# Patient Record
Sex: FEMALE | Race: BLACK OR AFRICAN AMERICAN | NOT HISPANIC OR LATINO | URBAN - METROPOLITAN AREA
[De-identification: names, ages, dates, MRNs, and addresses within clinical notes are randomized per-mention and may not be internally consistent; named-entity substitution may affect disease eponyms.]

---

## 2020-01-13 ENCOUNTER — INPATIENT (INPATIENT)
Facility: HOSPITAL | Age: 35
LOS: 3 days | Discharge: HOME | End: 2020-01-17
Attending: INTERNAL MEDICINE | Admitting: INTERNAL MEDICINE
Payer: MEDICAID

## 2020-01-13 VITALS
RESPIRATION RATE: 18 BRPM | HEART RATE: 119 BPM | OXYGEN SATURATION: 100 % | TEMPERATURE: 98 F | DIASTOLIC BLOOD PRESSURE: 68 MMHG | SYSTOLIC BLOOD PRESSURE: 126 MMHG

## 2020-01-13 LAB
ALBUMIN SERPL ELPH-MCNC: 5 G/DL — SIGNIFICANT CHANGE UP (ref 3.5–5.2)
ALP SERPL-CCNC: 133 U/L — HIGH (ref 30–115)
ALT FLD-CCNC: 25 U/L — SIGNIFICANT CHANGE UP (ref 0–41)
ANION GAP SERPL CALC-SCNC: 37 MMOL/L — HIGH (ref 7–14)
APPEARANCE UR: CLEAR — SIGNIFICANT CHANGE UP
APTT BLD: 33.2 SEC — SIGNIFICANT CHANGE UP (ref 27–39.2)
AST SERPL-CCNC: 37 U/L — SIGNIFICANT CHANGE UP (ref 0–41)
B-OH-BUTYR SERPL-SCNC: >9 MMOL/L — HIGH
BACTERIA # UR AUTO: NEGATIVE — SIGNIFICANT CHANGE UP
BASE EXCESS BLDV CALC-SCNC: -27.1 MMOL/L — LOW (ref -2–2)
BASOPHILS # BLD AUTO: 0 K/UL — SIGNIFICANT CHANGE UP (ref 0–0.2)
BASOPHILS NFR BLD AUTO: 0 % — SIGNIFICANT CHANGE UP (ref 0–1)
BILIRUB SERPL-MCNC: <0.2 MG/DL — SIGNIFICANT CHANGE UP (ref 0.2–1.2)
BILIRUB UR-MCNC: NEGATIVE — SIGNIFICANT CHANGE UP
BUN SERPL-MCNC: 25 MG/DL — HIGH (ref 10–20)
CA-I SERPL-SCNC: 1.33 MMOL/L — HIGH (ref 1.12–1.3)
CALCIUM SERPL-MCNC: 10.3 MG/DL — HIGH (ref 8.5–10.1)
CHLORIDE SERPL-SCNC: 95 MMOL/L — LOW (ref 98–110)
CO2 SERPL-SCNC: 5 MMOL/L — CRITICAL LOW (ref 17–32)
COLOR SPEC: COLORLESS — SIGNIFICANT CHANGE UP
CREAT SERPL-MCNC: 1.7 MG/DL — HIGH (ref 0.7–1.5)
DIFF PNL FLD: SIGNIFICANT CHANGE UP
EOSINOPHIL # BLD AUTO: 0.23 K/UL — SIGNIFICANT CHANGE UP (ref 0–0.7)
EOSINOPHIL NFR BLD AUTO: 0.9 % — SIGNIFICANT CHANGE UP (ref 0–8)
EPI CELLS # UR: 1 /HPF — SIGNIFICANT CHANGE UP (ref 0–5)
GAS PNL BLDV: 138 MMOL/L — SIGNIFICANT CHANGE UP (ref 136–145)
GAS PNL BLDV: SIGNIFICANT CHANGE UP
GIANT PLATELETS BLD QL SMEAR: PRESENT — SIGNIFICANT CHANGE UP
GLUCOSE BLDC GLUCOMTR-MCNC: 375 MG/DL — HIGH (ref 70–99)
GLUCOSE BLDC GLUCOMTR-MCNC: 410 MG/DL — HIGH (ref 70–99)
GLUCOSE BLDC GLUCOMTR-MCNC: 489 MG/DL — CRITICAL HIGH (ref 70–99)
GLUCOSE BLDC GLUCOMTR-MCNC: 556 MG/DL — CRITICAL HIGH (ref 70–99)
GLUCOSE BLDC GLUCOMTR-MCNC: 585 MG/DL — CRITICAL HIGH (ref 70–99)
GLUCOSE SERPL-MCNC: 938 MG/DL — CRITICAL HIGH (ref 70–99)
GLUCOSE UR QL: ABNORMAL
HCG SERPL QL: NEGATIVE — SIGNIFICANT CHANGE UP
HCO3 BLDV-SCNC: 5 MMOL/L — LOW (ref 22–29)
HCT VFR BLD CALC: 49.3 % — HIGH (ref 37–47)
HCT VFR BLDA CALC: 44.4 % — HIGH (ref 34–44)
HGB BLD CALC-MCNC: 14.5 G/DL — SIGNIFICANT CHANGE UP (ref 14–18)
HGB BLD-MCNC: 13.8 G/DL — SIGNIFICANT CHANGE UP (ref 12–16)
HYALINE CASTS # UR AUTO: 2 /LPF — SIGNIFICANT CHANGE UP (ref 0–7)
INR BLD: 1.17 RATIO — SIGNIFICANT CHANGE UP (ref 0.65–1.3)
KETONES UR-MCNC: ABNORMAL
LACTATE BLDV-MCNC: 7.2 MMOL/L — HIGH (ref 0.5–1.6)
LEUKOCYTE ESTERASE UR-ACNC: NEGATIVE — SIGNIFICANT CHANGE UP
LIDOCAIN IGE QN: 17 U/L — SIGNIFICANT CHANGE UP (ref 7–60)
LYMPHOCYTES # BLD AUTO: 1.59 K/UL — SIGNIFICANT CHANGE UP (ref 1.2–3.4)
LYMPHOCYTES # BLD AUTO: 6.2 % — LOW (ref 20.5–51.1)
MAGNESIUM SERPL-MCNC: 2.9 MG/DL — HIGH (ref 1.8–2.4)
MANUAL SMEAR VERIFICATION: SIGNIFICANT CHANGE UP
MCHC RBC-ENTMCNC: 27.2 PG — SIGNIFICANT CHANGE UP (ref 27–31)
MCHC RBC-ENTMCNC: 28 G/DL — LOW (ref 32–37)
MCV RBC AUTO: 97.2 FL — SIGNIFICANT CHANGE UP (ref 81–99)
METAMYELOCYTES # FLD: 0.9 % — HIGH (ref 0–0)
MONOCYTES # BLD AUTO: 1.82 K/UL — HIGH (ref 0.1–0.6)
MONOCYTES NFR BLD AUTO: 7.1 % — SIGNIFICANT CHANGE UP (ref 1.7–9.3)
NEUTROPHILS # BLD AUTO: 21.71 K/UL — HIGH (ref 1.4–6.5)
NEUTROPHILS NFR BLD AUTO: 71.7 % — SIGNIFICANT CHANGE UP (ref 42.2–75.2)
NEUTS BAND # BLD: 13.2 % — HIGH (ref 0–6)
NITRITE UR-MCNC: NEGATIVE — SIGNIFICANT CHANGE UP
NRBC # BLD: 1 /100 — HIGH (ref 0–0)
NRBC # BLD: SIGNIFICANT CHANGE UP /100 WBCS (ref 0–0)
PCO2 BLDV: 25 MMHG — LOW (ref 41–51)
PH BLDV: 6.9 — LOW (ref 7.26–7.43)
PH UR: 5.5 — SIGNIFICANT CHANGE UP (ref 5–8)
PLAT MORPH BLD: NORMAL — SIGNIFICANT CHANGE UP
PLATELET # BLD AUTO: 522 K/UL — HIGH (ref 130–400)
PO2 BLDV: 42 MMHG — HIGH (ref 20–40)
POIKILOCYTOSIS BLD QL AUTO: SLIGHT — SIGNIFICANT CHANGE UP
POTASSIUM BLDV-SCNC: 6.8 MMOL/L — HIGH (ref 3.3–5.6)
POTASSIUM SERPL-MCNC: 7.2 MMOL/L — CRITICAL HIGH (ref 3.5–5)
POTASSIUM SERPL-SCNC: 7.2 MMOL/L — CRITICAL HIGH (ref 3.5–5)
PROT SERPL-MCNC: 9.1 G/DL — HIGH (ref 6–8)
PROT UR-MCNC: ABNORMAL
PROTHROM AB SERPL-ACNC: 13.4 SEC — HIGH (ref 9.95–12.87)
RBC # BLD: 5.07 M/UL — SIGNIFICANT CHANGE UP (ref 4.2–5.4)
RBC # FLD: 14.1 % — SIGNIFICANT CHANGE UP (ref 11.5–14.5)
RBC BLD AUTO: ABNORMAL
RBC CASTS # UR COMP ASSIST: 0 /HPF — SIGNIFICANT CHANGE UP (ref 0–4)
SAO2 % BLDV: 59 % — SIGNIFICANT CHANGE UP
SODIUM SERPL-SCNC: 137 MMOL/L — SIGNIFICANT CHANGE UP (ref 135–146)
SP GR SPEC: 1.02 — SIGNIFICANT CHANGE UP (ref 1.01–1.02)
TROPONIN T SERPL-MCNC: <0.01 NG/ML — SIGNIFICANT CHANGE UP
UROBILINOGEN FLD QL: SIGNIFICANT CHANGE UP
WBC # BLD: 25.57 K/UL — HIGH (ref 4.8–10.8)
WBC # FLD AUTO: 25.57 K/UL — HIGH (ref 4.8–10.8)
WBC UR QL: 0 /HPF — SIGNIFICANT CHANGE UP (ref 0–5)

## 2020-01-13 PROCEDURE — 93010 ELECTROCARDIOGRAM REPORT: CPT | Mod: 76

## 2020-01-13 PROCEDURE — 99291 CRITICAL CARE FIRST HOUR: CPT

## 2020-01-13 PROCEDURE — 71045 X-RAY EXAM CHEST 1 VIEW: CPT | Mod: 26

## 2020-01-13 RX ORDER — CALCIUM GLUCONATE 100 MG/ML
3 VIAL (ML) INTRAVENOUS ONCE
Refills: 0 | Status: COMPLETED | OUTPATIENT
Start: 2020-01-13 | End: 2020-01-13

## 2020-01-13 RX ORDER — SODIUM CHLORIDE 9 MG/ML
2000 INJECTION, SOLUTION INTRAVENOUS ONCE
Refills: 0 | Status: COMPLETED | OUTPATIENT
Start: 2020-01-13 | End: 2020-01-13

## 2020-01-13 RX ORDER — SODIUM CHLORIDE 9 MG/ML
1000 INJECTION, SOLUTION INTRAVENOUS
Refills: 0 | Status: DISCONTINUED | OUTPATIENT
Start: 2020-01-13 | End: 2020-01-14

## 2020-01-13 RX ORDER — HEPARIN SODIUM 5000 [USP'U]/ML
5000 INJECTION INTRAVENOUS; SUBCUTANEOUS EVERY 8 HOURS
Refills: 0 | Status: DISCONTINUED | OUTPATIENT
Start: 2020-01-13 | End: 2020-01-17

## 2020-01-13 RX ORDER — INFLUENZA VIRUS VACCINE 15; 15; 15; 15 UG/.5ML; UG/.5ML; UG/.5ML; UG/.5ML
0.5 SUSPENSION INTRAMUSCULAR ONCE
Refills: 0 | Status: DISCONTINUED | OUTPATIENT
Start: 2020-01-13 | End: 2020-01-17

## 2020-01-13 RX ORDER — ALBUTEROL 90 UG/1
2.5 AEROSOL, METERED ORAL EVERY 6 HOURS
Refills: 0 | Status: DISCONTINUED | OUTPATIENT
Start: 2020-01-13 | End: 2020-01-17

## 2020-01-13 RX ORDER — MORPHINE SULFATE 50 MG/1
4 CAPSULE, EXTENDED RELEASE ORAL ONCE
Refills: 0 | Status: DISCONTINUED | OUTPATIENT
Start: 2020-01-13 | End: 2020-01-13

## 2020-01-13 RX ORDER — INSULIN HUMAN 100 [IU]/ML
2 INJECTION, SOLUTION SUBCUTANEOUS
Qty: 100 | Refills: 0 | Status: DISCONTINUED | OUTPATIENT
Start: 2020-01-13 | End: 2020-01-14

## 2020-01-13 RX ORDER — PANTOPRAZOLE SODIUM 20 MG/1
40 TABLET, DELAYED RELEASE ORAL DAILY
Refills: 0 | Status: DISCONTINUED | OUTPATIENT
Start: 2020-01-13 | End: 2020-01-16

## 2020-01-13 RX ORDER — ONDANSETRON 8 MG/1
4 TABLET, FILM COATED ORAL ONCE
Refills: 0 | Status: COMPLETED | OUTPATIENT
Start: 2020-01-13 | End: 2020-01-13

## 2020-01-13 RX ADMIN — MORPHINE SULFATE 4 MILLIGRAM(S): 50 CAPSULE, EXTENDED RELEASE ORAL at 18:00

## 2020-01-13 RX ADMIN — SODIUM CHLORIDE 2000 MILLILITER(S): 9 INJECTION, SOLUTION INTRAVENOUS at 18:50

## 2020-01-13 RX ADMIN — SODIUM CHLORIDE 2000 MILLILITER(S): 9 INJECTION, SOLUTION INTRAVENOUS at 18:31

## 2020-01-13 RX ADMIN — ALBUTEROL 2.5 MILLIGRAM(S): 90 AEROSOL, METERED ORAL at 23:46

## 2020-01-13 RX ADMIN — ONDANSETRON 4 MILLIGRAM(S): 8 TABLET, FILM COATED ORAL at 18:51

## 2020-01-13 RX ADMIN — SODIUM CHLORIDE 250 MILLILITER(S): 9 INJECTION, SOLUTION INTRAVENOUS at 20:55

## 2020-01-13 RX ADMIN — INSULIN HUMAN 6 UNIT(S)/HR: 100 INJECTION, SOLUTION SUBCUTANEOUS at 20:59

## 2020-01-13 RX ADMIN — Medication 100 GRAM(S): at 18:30

## 2020-01-13 NOTE — ED ADULT NURSE NOTE - NSIMPLEMENTINTERV_GEN_ALL_ED
Implemented All Fall with Harm Risk Interventions:  [place] to call system. Call bell, personal items and telephone within reach. Instruct patient to call for assistance. Room bathroom lighting operational. Non-slip footwear when patient is off stretcher. Physically safe environment: no spills, clutter or unnecessary equipment. Stretcher in lowest position, wheels locked, appropriate side rails in place. Provide visual cue, wrist band, yellow gown, etc. Monitor gait and stability. Monitor for mental status changes and reorient to person, place, and time. Review medications for side effects contributing to fall risk. Reinforce activity limits and safety measures with patient and family. Provide visual clues: red socks.

## 2020-01-13 NOTE — ED ADULT NURSE NOTE - OBJECTIVE STATEMENT
pt presents with sob, chest pain, hx of diabetes, last dose of insulin taken was 2 days ago, pt unable to speak full sentences and speak clearly, unable to obtain full history.

## 2020-01-13 NOTE — ED PROVIDER NOTE - CRITICAL CARE PROVIDED
documentation/interpretation of diagnostic studies/direct patient care (not related to procedure)/consultation with other physicians/additional history taking

## 2020-01-13 NOTE — H&P ADULT - HISTORY OF PRESENT ILLNESS
35 y/o F with PMHx DM type 1, presents for SOB, nausea, vomiting, abd pain for 2 days. Pt states has not taken her insulin for 2 days. Per pt she moved to [place] a month ago and had trouble getting Insulin but two days ago pt ran out of insulin. In ED pt was found to have HAGMA secondary to DKA along with Hyperkalemia upto 7.2 . EKG consistent with Hyperacute T waves. In ED pt recieved two boluses of 2  liters of LR along with calcium gluconate.

## 2020-01-13 NOTE — H&P ADULT - NSHPLABSRESULTS_GEN_ALL_CORE
CBC Full  -  ( 13 Jan 2020 17:07 )  WBC Count : 25.57 K/uL  RBC Count : 5.07 M/uL  Hemoglobin : 13.8 g/dL  Hematocrit : 49.3 %  Platelet Count - Automated : 522 K/uL  Mean Cell Volume : 97.2 fL  Mean Cell Hemoglobin : 27.2 pg  Mean Cell Hemoglobin Concentration : 28.0 g/dL  Auto Neutrophil # : 21.71 K/uL  Auto Lymphocyte # : 1.59 K/uL  Auto Monocyte # : 1.82 K/uL  Auto Eosinophil # : 0.23 K/uL  Auto Basophil # : 0.00 K/uL  Auto Neutrophil % : 71.7 %  Auto Lymphocyte % : 6.2 %  Auto Monocyte % : 7.1 %  Auto Eosinophil % : 0.9 %  Auto Basophil % : 0.0 %      01-13    137  |  95<L>  |  25<H>  ----------------------------<  938<HH>  7.2<HH>   |  5<LL>  |  1.7<H>    Ca    10.3<H>      13 Jan 2020 17:07  Mg     2.9     01-13    TPro  9.1<H>  /  Alb  5.0  /  TBili  <0.2  /  DBili  x   /  AST  37  /  ALT  25  /  AlkPhos  133<H>  01-13

## 2020-01-13 NOTE — ED PROVIDER NOTE - OBJECTIVE STATEMENT
33yo F with PMHx DM type 1, presents for SOB, nausea, vomiting, abd pain for 2 days. Pt states has not taken her insulin for 2 days. C/o dry mouth, urinary frequency.

## 2020-01-13 NOTE — ED PROVIDER NOTE - PHYSICAL EXAMINATION
Vital signs reviewed  GENERAL: Patient ill appearing  HEAD: NCAT  EYES: Anicteric  ENT: Dry MM  RESPIRATORY: Increased work of breathing, tachypneic. CTA B/L. No wheezing, rales, rhonchi  CARDIOVASCULAR: tachycardic, regular rhythm.   ABDOMEN: Soft. Nondistended. Nontender. No guarding or rebound.   MUSCULOSKELETAL/EXTREMITIES: Brisk cap refill. Equal radial pulses. No leg edema.  SKIN:  Warm and dry  NEURO: Awake, alert. Sleepy but arousable.

## 2020-01-13 NOTE — ED PROVIDER NOTE - NS ED ROS FT
Constitutional: No fever  ENMT:  No neck pain  Cardiac:  +chest pain  Respiratory:  No cough. +SOB  GI:  +nausea, vomiting, abdominal pain. No diarrhea  :  No dysuria, hematuria  MS:  No back pain.  Neuro:  +lightheadedness  Skin:  No skin rash  Endocrine: h/o DM  Except as documented in the HPI,  all other systems are negative.

## 2020-01-13 NOTE — H&P ADULT - ASSESSMENT
35 y/o F with PMHx DM type 1, presents for SOB, nausea, vomiting, abd pain for 2 days. Pt states has not taken her insulin for 2 days. Per pt she moved to [place] a month ago and had trouble getting Insulin but two days ago pt ran out of insulin. In ED pt was found to have HAGMA secondary to DKA along with Hyperkalemia upto 7.2 . EKG consistent with Hyperacute T waves. In ED pt recieved two boluses of 2  liters of LR along with calcium gluconate.     Impression   -HAGMA secondary to DKA  -Hyperkalemia  - ALHAJI    Plan     1.CNS no excessive sedation     2. CVS Keep I > O for now , corrected Na 150. Will use 1/2 NS @ 250. Repeat EKG     3. PULMONARY HOB @ 45     4. INFECTIOUS DISEASE Panculture , monitor off antibiotics.     5. GI NPO for now     6. RENAL and acid base  : k elevated , s/p ca- gluconate , f/u ABG . Renal lytes    7. Endocrine Insulin drip @ 6 units / hr , can switch two NS when corrected Na @ 135 . Change to dextrose 5 % once FS < 250. Fs 1 hr    8. Hematology f/u cbc    9. DVT AND GI PROPHYLAXIS    10. Dispo keep in MICU    11. Code Status Full

## 2020-01-13 NOTE — H&P ADULT - NSHPPHYSICALEXAM_GEN_ALL_CORE
PHYSICAL EXAM:  GENERAL: NAD, well-developed  HEAD:  Atraumatic, Normocephalic  EYES: EOMI, PERRLA, conjunctiva and sclera clear  NECK: Supple, No JVD  CHEST/LUNG: Clear to auscultation bilaterally; No wheeze  HEART: Regular rate and rhythm; No murmurs, rubs, or gallops  ABDOMEN: abdominal pain   EXTREMITIES:  2+ Peripheral Pulses, No clubbing, cyanosis, or edema  PSYCH: AAOx3  NEUROLOGY: non-focal  SKIN: No rashes or lesions

## 2020-01-13 NOTE — ED PROVIDER NOTE - PROGRESS NOTE DETAILS
Transferred to critical care area, Dr. Carrasquillo at bedside. Sign off from Dr. Villegas. 33 yo F PMH type 1 DM here for decay. Pt moved to [place] x1 month ago and has a hard time getting access to insulin, her last dose was 2 days ago. Plan: Aggressive rehydration, monitor potassium and Ph, insulin drip.

## 2020-01-14 LAB
ALBUMIN SERPL ELPH-MCNC: 3.7 G/DL — SIGNIFICANT CHANGE UP (ref 3.5–5.2)
ALP SERPL-CCNC: 93 U/L — SIGNIFICANT CHANGE UP (ref 30–115)
ALT FLD-CCNC: 25 U/L — SIGNIFICANT CHANGE UP (ref 0–41)
ANION GAP SERPL CALC-SCNC: 16 MMOL/L — HIGH (ref 7–14)
ANION GAP SERPL CALC-SCNC: 17 MMOL/L — HIGH (ref 7–14)
ANION GAP SERPL CALC-SCNC: 23 MMOL/L — HIGH (ref 7–14)
AST SERPL-CCNC: 55 U/L — HIGH (ref 0–41)
BASOPHILS # BLD AUTO: 0.06 K/UL — SIGNIFICANT CHANGE UP (ref 0–0.2)
BASOPHILS NFR BLD AUTO: 0.3 % — SIGNIFICANT CHANGE UP (ref 0–1)
BILIRUB SERPL-MCNC: <0.2 MG/DL — SIGNIFICANT CHANGE UP (ref 0.2–1.2)
BUN SERPL-MCNC: 12 MG/DL — SIGNIFICANT CHANGE UP (ref 10–20)
BUN SERPL-MCNC: 16 MG/DL — SIGNIFICANT CHANGE UP (ref 10–20)
BUN SERPL-MCNC: 16 MG/DL — SIGNIFICANT CHANGE UP (ref 10–20)
CALCIUM SERPL-MCNC: 9 MG/DL — SIGNIFICANT CHANGE UP (ref 8.5–10.1)
CALCIUM SERPL-MCNC: 9.3 MG/DL — SIGNIFICANT CHANGE UP (ref 8.5–10.1)
CALCIUM SERPL-MCNC: 9.5 MG/DL — SIGNIFICANT CHANGE UP (ref 8.5–10.1)
CHLORIDE SERPL-SCNC: 110 MMOL/L — SIGNIFICANT CHANGE UP (ref 98–110)
CHLORIDE SERPL-SCNC: 111 MMOL/L — HIGH (ref 98–110)
CHLORIDE SERPL-SCNC: 113 MMOL/L — HIGH (ref 98–110)
CK MB CFR SERPL CALC: 1.4 NG/ML — SIGNIFICANT CHANGE UP (ref 0.6–6.3)
CK SERPL-CCNC: 98 U/L — SIGNIFICANT CHANGE UP (ref 0–225)
CO2 SERPL-SCNC: 12 MMOL/L — LOW (ref 17–32)
CO2 SERPL-SCNC: 15 MMOL/L — LOW (ref 17–32)
CO2 SERPL-SCNC: 7 MMOL/L — CRITICAL LOW (ref 17–32)
CREAT SERPL-MCNC: 0.9 MG/DL — SIGNIFICANT CHANGE UP (ref 0.7–1.5)
CREAT SERPL-MCNC: 1.1 MG/DL — SIGNIFICANT CHANGE UP (ref 0.7–1.5)
CREAT SERPL-MCNC: 1.1 MG/DL — SIGNIFICANT CHANGE UP (ref 0.7–1.5)
EOSINOPHIL # BLD AUTO: 0 K/UL — SIGNIFICANT CHANGE UP (ref 0–0.7)
EOSINOPHIL NFR BLD AUTO: 0 % — SIGNIFICANT CHANGE UP (ref 0–8)
GAS PNL BLDA: SIGNIFICANT CHANGE UP
GLUCOSE BLDC GLUCOMTR-MCNC: 113 MG/DL — HIGH (ref 70–99)
GLUCOSE BLDC GLUCOMTR-MCNC: 122 MG/DL — HIGH (ref 70–99)
GLUCOSE BLDC GLUCOMTR-MCNC: 144 MG/DL — HIGH (ref 70–99)
GLUCOSE BLDC GLUCOMTR-MCNC: 149 MG/DL — HIGH (ref 70–99)
GLUCOSE BLDC GLUCOMTR-MCNC: 152 MG/DL — HIGH (ref 70–99)
GLUCOSE BLDC GLUCOMTR-MCNC: 158 MG/DL — HIGH (ref 70–99)
GLUCOSE BLDC GLUCOMTR-MCNC: 158 MG/DL — HIGH (ref 70–99)
GLUCOSE BLDC GLUCOMTR-MCNC: 166 MG/DL — HIGH (ref 70–99)
GLUCOSE BLDC GLUCOMTR-MCNC: 174 MG/DL — HIGH (ref 70–99)
GLUCOSE BLDC GLUCOMTR-MCNC: 182 MG/DL — HIGH (ref 70–99)
GLUCOSE BLDC GLUCOMTR-MCNC: 182 MG/DL — HIGH (ref 70–99)
GLUCOSE BLDC GLUCOMTR-MCNC: 186 MG/DL — HIGH (ref 70–99)
GLUCOSE BLDC GLUCOMTR-MCNC: 188 MG/DL — HIGH (ref 70–99)
GLUCOSE BLDC GLUCOMTR-MCNC: 200 MG/DL — HIGH (ref 70–99)
GLUCOSE BLDC GLUCOMTR-MCNC: 205 MG/DL — HIGH (ref 70–99)
GLUCOSE BLDC GLUCOMTR-MCNC: 216 MG/DL — HIGH (ref 70–99)
GLUCOSE BLDC GLUCOMTR-MCNC: 217 MG/DL — HIGH (ref 70–99)
GLUCOSE BLDC GLUCOMTR-MCNC: 67 MG/DL — LOW (ref 70–99)
GLUCOSE BLDC GLUCOMTR-MCNC: 82 MG/DL — SIGNIFICANT CHANGE UP (ref 70–99)
GLUCOSE SERPL-MCNC: 168 MG/DL — HIGH (ref 70–99)
GLUCOSE SERPL-MCNC: 201 MG/DL — HIGH (ref 70–99)
GLUCOSE SERPL-MCNC: 227 MG/DL — HIGH (ref 70–99)
HCT VFR BLD CALC: 34.8 % — LOW (ref 37–47)
HCT VFR BLD CALC: 36.6 % — LOW (ref 37–47)
HGB BLD-MCNC: 10.9 G/DL — LOW (ref 12–16)
HGB BLD-MCNC: 11 G/DL — LOW (ref 12–16)
IMM GRANULOCYTES NFR BLD AUTO: 2.2 % — HIGH (ref 0.1–0.3)
LACTATE SERPL-SCNC: 2 MMOL/L — SIGNIFICANT CHANGE UP (ref 0.7–2)
LYMPHOCYTES # BLD AUTO: 11 % — LOW (ref 20.5–51.1)
LYMPHOCYTES # BLD AUTO: 2.46 K/UL — SIGNIFICANT CHANGE UP (ref 1.2–3.4)
MAGNESIUM SERPL-MCNC: 1.5 MG/DL — LOW (ref 1.8–2.4)
MCHC RBC-ENTMCNC: 27.2 PG — SIGNIFICANT CHANGE UP (ref 27–31)
MCHC RBC-ENTMCNC: 28.4 PG — SIGNIFICANT CHANGE UP (ref 27–31)
MCHC RBC-ENTMCNC: 30.1 G/DL — LOW (ref 32–37)
MCHC RBC-ENTMCNC: 31.3 G/DL — LOW (ref 32–37)
MCV RBC AUTO: 90.4 FL — SIGNIFICANT CHANGE UP (ref 81–99)
MCV RBC AUTO: 90.6 FL — SIGNIFICANT CHANGE UP (ref 81–99)
MONOCYTES # BLD AUTO: 2.03 K/UL — HIGH (ref 0.1–0.6)
MONOCYTES NFR BLD AUTO: 9.1 % — SIGNIFICANT CHANGE UP (ref 1.7–9.3)
NEUTROPHILS # BLD AUTO: 17.34 K/UL — HIGH (ref 1.4–6.5)
NEUTROPHILS NFR BLD AUTO: 77.4 % — HIGH (ref 42.2–75.2)
NRBC # BLD: 0 /100 WBCS — SIGNIFICANT CHANGE UP (ref 0–0)
NRBC # BLD: 0 /100 WBCS — SIGNIFICANT CHANGE UP (ref 0–0)
PHOSPHATE SERPL-MCNC: 2.6 MG/DL — SIGNIFICANT CHANGE UP (ref 2.1–4.9)
PLATELET # BLD AUTO: 351 K/UL — SIGNIFICANT CHANGE UP (ref 130–400)
PLATELET # BLD AUTO: 385 K/UL — SIGNIFICANT CHANGE UP (ref 130–400)
POTASSIUM SERPL-MCNC: 3.8 MMOL/L — SIGNIFICANT CHANGE UP (ref 3.5–5)
POTASSIUM SERPL-MCNC: 4.5 MMOL/L — SIGNIFICANT CHANGE UP (ref 3.5–5)
POTASSIUM SERPL-MCNC: 5 MMOL/L — SIGNIFICANT CHANGE UP (ref 3.5–5)
POTASSIUM SERPL-SCNC: 3.8 MMOL/L — SIGNIFICANT CHANGE UP (ref 3.5–5)
POTASSIUM SERPL-SCNC: 4.5 MMOL/L — SIGNIFICANT CHANGE UP (ref 3.5–5)
POTASSIUM SERPL-SCNC: 5 MMOL/L — SIGNIFICANT CHANGE UP (ref 3.5–5)
PROCALCITONIN SERPL-MCNC: 3.08 NG/ML — HIGH (ref 0.02–0.1)
PROT SERPL-MCNC: 6.9 G/DL — SIGNIFICANT CHANGE UP (ref 6–8)
RBC # BLD: 3.84 M/UL — LOW (ref 4.2–5.4)
RBC # BLD: 4.05 M/UL — LOW (ref 4.2–5.4)
RBC # FLD: 13.6 % — SIGNIFICANT CHANGE UP (ref 11.5–14.5)
RBC # FLD: 13.8 % — SIGNIFICANT CHANGE UP (ref 11.5–14.5)
SODIUM SERPL-SCNC: 140 MMOL/L — SIGNIFICANT CHANGE UP (ref 135–146)
SODIUM SERPL-SCNC: 141 MMOL/L — SIGNIFICANT CHANGE UP (ref 135–146)
SODIUM SERPL-SCNC: 143 MMOL/L — SIGNIFICANT CHANGE UP (ref 135–146)
TROPONIN T SERPL-MCNC: <0.01 NG/ML — SIGNIFICANT CHANGE UP
TROPONIN T SERPL-MCNC: <0.01 NG/ML — SIGNIFICANT CHANGE UP
WBC # BLD: 17.78 K/UL — HIGH (ref 4.8–10.8)
WBC # BLD: 22.38 K/UL — HIGH (ref 4.8–10.8)
WBC # FLD AUTO: 17.78 K/UL — HIGH (ref 4.8–10.8)
WBC # FLD AUTO: 22.38 K/UL — HIGH (ref 4.8–10.8)

## 2020-01-14 PROCEDURE — 76770 US EXAM ABDO BACK WALL COMP: CPT | Mod: 26

## 2020-01-14 PROCEDURE — 71045 X-RAY EXAM CHEST 1 VIEW: CPT | Mod: 26

## 2020-01-14 RX ORDER — INSULIN HUMAN 100 [IU]/ML
2 INJECTION, SOLUTION SUBCUTANEOUS
Qty: 50 | Refills: 0 | Status: DISCONTINUED | OUTPATIENT
Start: 2020-01-14 | End: 2020-01-14

## 2020-01-14 RX ORDER — DEXTROSE 50 % IN WATER 50 %
50 SYRINGE (ML) INTRAVENOUS ONCE
Refills: 0 | Status: COMPLETED | OUTPATIENT
Start: 2020-01-14 | End: 2020-01-14

## 2020-01-14 RX ORDER — ACETAMINOPHEN 500 MG
650 TABLET ORAL ONCE
Refills: 0 | Status: COMPLETED | OUTPATIENT
Start: 2020-01-14 | End: 2020-01-14

## 2020-01-14 RX ORDER — INSULIN HUMAN 100 [IU]/ML
2 INJECTION, SOLUTION SUBCUTANEOUS
Qty: 100 | Refills: 0 | Status: DISCONTINUED | OUTPATIENT
Start: 2020-01-14 | End: 2020-01-15

## 2020-01-14 RX ORDER — INSULIN HUMAN 100 [IU]/ML
3 INJECTION, SOLUTION SUBCUTANEOUS
Qty: 100 | Refills: 0 | Status: DISCONTINUED | OUTPATIENT
Start: 2020-01-14 | End: 2020-01-14

## 2020-01-14 RX ORDER — SODIUM CHLORIDE 9 MG/ML
1000 INJECTION, SOLUTION INTRAVENOUS
Refills: 0 | Status: DISCONTINUED | OUTPATIENT
Start: 2020-01-14 | End: 2020-01-14

## 2020-01-14 RX ORDER — SODIUM BICARBONATE 1 MEQ/ML
0.19 SYRINGE (ML) INTRAVENOUS
Qty: 150 | Refills: 0 | Status: DISCONTINUED | OUTPATIENT
Start: 2020-01-14 | End: 2020-01-14

## 2020-01-14 RX ORDER — MAGNESIUM SULFATE 500 MG/ML
2 VIAL (ML) INJECTION EVERY 4 HOURS
Refills: 0 | Status: COMPLETED | OUTPATIENT
Start: 2020-01-14 | End: 2020-01-14

## 2020-01-14 RX ORDER — INSULIN HUMAN 100 [IU]/ML
2 INJECTION, SOLUTION SUBCUTANEOUS
Qty: 100 | Refills: 0 | Status: DISCONTINUED | OUTPATIENT
Start: 2020-01-14 | End: 2020-01-14

## 2020-01-14 RX ORDER — CHLORHEXIDINE GLUCONATE 213 G/1000ML
1 SOLUTION TOPICAL
Refills: 0 | Status: DISCONTINUED | OUTPATIENT
Start: 2020-01-14 | End: 2020-01-17

## 2020-01-14 RX ORDER — SODIUM CHLORIDE 9 MG/ML
1000 INJECTION, SOLUTION INTRAVENOUS
Refills: 0 | Status: DISCONTINUED | OUTPATIENT
Start: 2020-01-14 | End: 2020-01-15

## 2020-01-14 RX ADMIN — INSULIN HUMAN 2 UNIT(S)/HR: 100 INJECTION, SOLUTION SUBCUTANEOUS at 02:29

## 2020-01-14 RX ADMIN — Medication 650 MILLIGRAM(S): at 20:19

## 2020-01-14 RX ADMIN — Medication 650 MILLIGRAM(S): at 13:30

## 2020-01-14 RX ADMIN — SODIUM CHLORIDE 250 MILLILITER(S): 9 INJECTION, SOLUTION INTRAVENOUS at 06:58

## 2020-01-14 RX ADMIN — SODIUM CHLORIDE 150 MILLILITER(S): 9 INJECTION, SOLUTION INTRAVENOUS at 01:15

## 2020-01-14 RX ADMIN — SODIUM CHLORIDE 250 MILLILITER(S): 9 INJECTION, SOLUTION INTRAVENOUS at 17:22

## 2020-01-14 RX ADMIN — Medication 12.5 GRAM(S): at 17:22

## 2020-01-14 RX ADMIN — SODIUM CHLORIDE 125 MILLILITER(S): 9 INJECTION, SOLUTION INTRAVENOUS at 03:29

## 2020-01-14 RX ADMIN — ALBUTEROL 2.5 MILLIGRAM(S): 90 AEROSOL, METERED ORAL at 09:45

## 2020-01-14 RX ADMIN — Medication 50 MILLILITER(S): at 21:20

## 2020-01-14 RX ADMIN — Medication 650 MILLIGRAM(S): at 03:00

## 2020-01-14 RX ADMIN — HEPARIN SODIUM 5000 UNIT(S): 5000 INJECTION INTRAVENOUS; SUBCUTANEOUS at 06:13

## 2020-01-14 RX ADMIN — SODIUM CHLORIDE 250 MILLILITER(S): 9 INJECTION, SOLUTION INTRAVENOUS at 21:55

## 2020-01-14 RX ADMIN — HEPARIN SODIUM 5000 UNIT(S): 5000 INJECTION INTRAVENOUS; SUBCUTANEOUS at 21:56

## 2020-01-14 RX ADMIN — SODIUM CHLORIDE 250 MILLILITER(S): 9 INJECTION, SOLUTION INTRAVENOUS at 02:28

## 2020-01-14 RX ADMIN — Medication 650 MILLIGRAM(S): at 20:49

## 2020-01-14 RX ADMIN — Medication 650 MILLIGRAM(S): at 13:34

## 2020-01-14 RX ADMIN — Medication 12.5 GRAM(S): at 15:49

## 2020-01-14 RX ADMIN — INSULIN HUMAN 3 UNIT(S)/HR: 100 INJECTION, SOLUTION SUBCUTANEOUS at 01:15

## 2020-01-14 RX ADMIN — CHLORHEXIDINE GLUCONATE 1 APPLICATION(S): 213 SOLUTION TOPICAL at 06:13

## 2020-01-14 RX ADMIN — Medication 75 MEQ/KG/HR: at 04:04

## 2020-01-14 RX ADMIN — Medication 650 MILLIGRAM(S): at 03:30

## 2020-01-14 RX ADMIN — PANTOPRAZOLE SODIUM 40 MILLIGRAM(S): 20 TABLET, DELAYED RELEASE ORAL at 13:31

## 2020-01-14 RX ADMIN — INSULIN HUMAN 2 UNIT(S)/HR: 100 INJECTION, SOLUTION SUBCUTANEOUS at 19:30

## 2020-01-14 RX ADMIN — HEPARIN SODIUM 5000 UNIT(S): 5000 INJECTION INTRAVENOUS; SUBCUTANEOUS at 13:31

## 2020-01-14 NOTE — CONSULT NOTE ADULT - SUBJECTIVE AND OBJECTIVE BOX
Patient is a 34y old  Female who presents with a chief complaint of n/v (2020 19:33)      HPI:  35 y/o F with PMHx DM type 1, presents for SOB, nausea, vomiting, abd pain for 2 days. Pt states has not taken her insulin for 2 days. Per pt she moved to [place] a month ago and had trouble getting Insulin but two days ago pt ran out of insulin. In ED pt was found to have HAGMA secondary to DKA along with Hyperkalemia upto 7.2 . EKG consistent with Hyperacute T waves. In ED pt recieved two boluses of 2  liters of LR along with calcium gluconate. (2020 19:33), admitted to CCU. feels better, d5 1/2 ns 250 cc / h and inulin drip      PAST MEDICAL & SURGICAL HISTORY:  Diabetes      SOCIAL HX:   Smoking  -    FAMILY HISTORY:      REVIEW OF SYSTEMS see hpi        Allergies    No Known Allergies    Intolerances        ALBUTerol    0.083% 2.5 milliGRAM(s) Nebulizer every 6 hours  chlorhexidine 4% Liquid 1 Application(s) Topical <User Schedule>  dextrose 5% + sodium chloride 0.45%. 1000 milliLiter(s) IV Continuous <Continuous>  heparin  Injectable 5000 Unit(s) SubCutaneous every 8 hours  influenza   Vaccine 0.5 milliLiter(s) IntraMuscular once  insulin regular Infusion 2 Unit(s)/Hr IV Continuous <Continuous>  pantoprazole  Injectable 40 milliGRAM(s) IV Push daily  sodium bicarbonate  Infusion 0.193 mEq/kG/Hr IV Continuous <Continuous>  : Home Meds:      PHYSICAL EXAM    ICU Vital Signs Last 24 Hrs  T(C): 36.9 (2020 08:00), Max: 37.1 (2020 00:00)  T(F): 98.5 (2020 08:00), Max: 98.7 (2020 00:00)  HR: 90 (2020 08:00) (90 - 120)  BP: 83/47 (2020 06:00) (83/47 - 126/68)  BP(mean): 66 (2020 06:00) (62 - 88)  RR: 13 (2020 08:00) (13 - 31)  SpO2: 100% (2020 08:00) (100% - 100%)      General: axox3 ketotic breath, dry oral mucosa  HEENT:  ALMA              Lymph Nodes: No cervical LN   Lungs: Bilateral BS  Cardiovascular: Regular  Abdomen: Soft, Positive BS  Extremities: No clubbing  Skin: Warm  Neurological: Non focal       20 @ 07:01  -  20 @ 07:00  --------------------------------------------------------  IN:    dextrose 5% + sodium chloride 0.45%.: 300 mL    dextrose 5% + sodium chloride 0.45%.: 250 mL    dextrose 5% + sodium chloride 0.45%.: 375 mL    insulin regular Infusion: 10 mL    insulin regular Infusion: 3 mL    insulin regular Infusion: 24 mL    sodium bicarbonate  Infusion: 150 mL    sodium chloride 0.45%: 1000 mL  Total IN: 2112 mL    OUT:    Voided: 1200 mL  Total OUT: 1200 mL    Total NET: 912 mL          LABS:                          10.9   17.78 )-----------( 351      ( 2020 04:45 )             34.8                                               01-14    140  |  111<H>  |  16  ----------------------------<  227<H>  5.0   |  12<L>  |  1.1    Ca    9.3      2020 04:45  Mg     2.9     01-    TPro  6.9  /  Alb  3.7  /  TBili  <0.2  /  DBili  x   /  AST  55<H>  /  ALT  25  /  AlkPhos  93  01-14      PT/INR - ( 2020 17:58 )   PT: 13.40 sec;   INR: 1.17 ratio         PTT - ( 2020 17:58 )  PTT:33.2 sec                                       Urinalysis Basic - ( 2020 17:07 )    Color: Colorless / Appearance: Clear / S.020 / pH: x  Gluc: x / Ketone: Large  / Bili: Negative / Urobili: <2 mg/dL   Blood: x / Protein: 30 mg/dL / Nitrite: Negative   Leuk Esterase: Negative / RBC: 0 /HPF / WBC 0 /HPF   Sq Epi: x / Non Sq Epi: 1 /HPF / Bacteria: Negative        CARDIAC MARKERS ( 2020 04:45 )  x     / <0.01 ng/mL / x     / x     / x      CARDIAC MARKERS ( 2020 02:40 )  x     / <0.01 ng/mL / 98 U/L / x     / 1.4 ng/mL  CARDIAC MARKERS ( 2020 17:58 )  x     / <0.01 ng/mL / x     / x     / x                                                LIVER FUNCTIONS - ( 2020 02:40 )  Alb: 3.7 g/dL / Pro: 6.9 g/dL / ALK PHOS: 93 U/L / ALT: 25 U/L / AST: 55 U/L / GGT: x                                                                                                                                   ABG - ( 2020 02:10 )  pH, Arterial: 7.22  pH, Blood: x     /  pCO2: 20    /  pO2: 149   / HCO3: 8     / Base Excess: -17.8 /  SaO2: 99                  X-Rays  reviewed    MEDICATIONS  (STANDING):  ALBUTerol    0.083% 2.5 milliGRAM(s) Nebulizer every 6 hours  chlorhexidine 4% Liquid 1 Application(s) Topical <User Schedule>  dextrose 5% + sodium chloride 0.45%. 1000 milliLiter(s) (250 mL/Hr) IV Continuous <Continuous>  heparin  Injectable 5000 Unit(s) SubCutaneous every 8 hours  influenza   Vaccine 0.5 milliLiter(s) IntraMuscular once  insulin regular Infusion 2 Unit(s)/Hr (2 mL/Hr) IV Continuous <Continuous>  pantoprazole  Injectable 40 milliGRAM(s) IV Push daily  sodium bicarbonate  Infusion 0.193 mEq/kG/Hr (75 mL/Hr) IV Continuous <Continuous>    MEDICATIONS  (PRN):

## 2020-01-14 NOTE — CONSULT NOTE ADULT - ASSESSMENT
IMPRESSION:    SEVERE DKA/ NON COMPLIANT WITH INSULIN BETTER  ALHAJI improving      PLAN:    cns AVOID DEPRESSAND    HEENT:  Oral care    PULMONARY:  HOB @ 45 degrees    CARDIOVASCULAR: IVF. insulin drip    GI: GI prophylaxis                                          Feeding clear    RENAL:  F/u  lytes.  Correct as needed. accurate I/O, trend CMP    INFECTIOUS DISEASE: cx    HEMATOLOGICAL:  DVT prophylaxis. endo eval    ENDOCRINE:  Follow up FS.  Insulin protocol if needed.      CODE STATUS: FULL CODE    DISPOSITION: Pt requires continued monitoring in the MICU

## 2020-01-14 NOTE — CONSULT NOTE ADULT - ASSESSMENT
33 y/o F with PMHx DM type 1 on insulin and metformin [place] [facility] [place] for [place]B, nausea, vomiting, abd pain for 2 days found to be in DKA with FS>600, AG 37, lactate 7.2, PH 6.9.     #)DKA secondary to non compliance ran out of medications  -, AG 37, lactate 7.2, PH of 6.9 at the time of admission  -Last  this AM, AG 17  -Currently on insulin  -c/w insulin till AG closes, c/w sodium bicarb, once AG closes and FS<200 reduce insulin and start feeding   -Repeat VBG  -check BMP, Magnesium, phosphorous Q6 33 y/o F with PMHx DM type 1 on insulin and metformin [place] up with endocrine in [facility] [place] presents for SOB, nausea, vomiting, abd pain for 2 days found to be in DKA with FS>600, AG 37, lactate 7.2, PH 6.9.     #)DKA secondary to non compliance ran out of medications  -, AG 37, lactate 7.2, PH of 6.9 at the time of admission  -Last  this AM, AG 17  -Currently on insulin  -c/w insulin till AG closes, c/w sodium bicarb, once AG closes and FS<200 reduce insulin and start feeding   -Repeat VBG  -check BMP, Magnesium, phosphorous Q6  -will discharge om insulin and follow up for endocrinology at the time of discharge 35 y/o F with PMHx DM type 1 on insulin and metformin [place] [facility] [place], nausea, vomiting, abd pain for 2 days found to be in DKA with FS>600, AG 37, lactate 7.2, PH 6.9.     #)DKA secondary to non compliance ran out of medications  -, AG 37, lactate 7.2, PH of 6.9 at the time of admission  -Last  this AM, AG 17  -Currently on insulin  -c/w insulin till AG closes, c/w sodium bicarb, once AG closes and FS<200 reduce insulin and start feeding   -Repeat VBG  -check BMP, Magnesium, phosphorous Q6  -will discharge om insulin and follow up for endocrinology at the time of discharge    attending note to follow

## 2020-01-14 NOTE — CONSULT NOTE ADULT - SUBJECTIVE AND OBJECTIVE BOX
Patient is a 34y old  Female who presents with a chief complaint of DKA (14 Jan 2020 08:43)      Admitted on: 01-13-20  HPI:  35 y/o F with PMHx DM type 1, presents for SOB, nausea, vomiting, abd pain for 2 days. Pt states has not taken her insulin for 2 days. Per pt she moved to [place] a month ago and had trouble getting Insulin but two days ago pt ran out of insulin. In ED pt was found to have HAGMA secondary to DKA along with Hyperkalemia upto 7.2 . EKG consistent with Hyperacute T waves. In ED pt recieved two boluses of 2  liters of LR along with calcium gluconate. (13 Jan 2020 19:33)      PAST MEDICAL & SURGICAL HISTORY:  Diabetes      FAMILY HISTORY:      Social History:  Tobacco:  Alcohol:  Drugs:    Home Medications:  HumaLOG 100 units/mL subcutaneous solution: 10 unit(s) subcutaneous 3 times a day (13 Jan 2020 19:38)  insulin glargine 100 units/mL subcutaneous solution: 25 unit(s) subcutaneous once a day (at bedtime) (13 Jan 2020 19:39)  metFORMIN 500 mg oral tablet: 1 tab(s) orally 2 times a day (13 Jan 2020 19:39)    MEDICATIONS  (STANDING):  ALBUTerol    0.083% 2.5 milliGRAM(s) Nebulizer every 6 hours  chlorhexidine 4% Liquid 1 Application(s) Topical <User Schedule>  dextrose 5% + sodium chloride 0.45%. 1000 milliLiter(s) (250 mL/Hr) IV Continuous <Continuous>  heparin  Injectable 5000 Unit(s) SubCutaneous every 8 hours  influenza   Vaccine 0.5 milliLiter(s) IntraMuscular once  insulin regular Infusion 2 Unit(s)/Hr (2 mL/Hr) IV Continuous <Continuous>  pantoprazole  Injectable 40 milliGRAM(s) IV Push daily  sodium bicarbonate  Infusion 0.193 mEq/kG/Hr (75 mL/Hr) IV Continuous <Continuous>    MEDICATIONS  (PRN):      Allergies  No Known Allergies      Review of Systems:   Constitutional:  No Fever, No Chills  ENT/Mouth:  No Hearing Changes,  No Difficulty Swallowing  Eyes:  No Eye Pain, No Vision Changes  Cardiovascular:  No Chest Pain, No Palpitations  Respiratory:  No Cough, No Dyspnea  Gastrointestinal:  As described in HPI  Musculoskeletal:  No Joint Swelling, No Back Pain  Skin:  No Skin Lesions, No Jaundice  Neuro:  No Syncope, No Dizziness  Heme/Lymph:  No Bruising, No Bleeding.          Physical Examination:  T(C): 36.9 (01-14-20 @ 08:00), Max: 37.1 (01-14-20 @ 00:00)  HR: 90 (01-14-20 @ 08:00) (90 - 120)  BP: 83/47 (01-14-20 @ 06:00) (83/47 - 126/68)  RR: 13 (01-14-20 @ 08:00) (13 - 31)  SpO2: 100% (01-14-20 @ 08:00) (100% - 100%)  Height (cm): 160 (01-13-20 @ 21:30)  Weight (kg): 58.4 (01-13-20 @ 21:30)    01-13-20 @ 07:01  -  01-14-20 @ 07:00  --------------------------------------------------------  IN: 2112 mL / OUT: 1200 mL / NET: 912 mL        Constitutional: No acute distress.  Eyes:. Conjunctivae are clear, Sclera is non-icteric.  Ears Nose and Throat: The external ears are normal appearing,  Oral mucosa is pink and moist.  Respiratory:  No signs of respiratory distress. Lung sounds are clear bilaterally.  Cardiovascular:  S1 S2, Regular rate and rhythm.  GI: Abdomen is soft, symmetric, and non-tender without distention. There are no visible lesions or scars. Bowel sounds are present and normoactive in all four quadrants. No masses, hepatomegaly, or splenomegaly are noted.   Neuro: No Tremor, No involuntary movements  Skin: No rashes, No Jaundice.          Data:                        10.9   17.78 )-----------( 351      ( 14 Jan 2020 04:45 )             34.8     Hgb Trend:  10.9  01-14-20 @ 04:45  11.0  01-14-20 @ 02:40  13.8  01-13-20 @ 17:07        01-14    140  |  111<H>  |  16  ----------------------------<  227<H>  5.0   |  12<L>  |  1.1    Ca    9.3      14 Jan 2020 04:45  Mg     2.9     01-13    TPro  6.9  /  Alb  3.7  /  TBili  <0.2  /  DBili  x   /  AST  55<H>  /  ALT  25  /  AlkPhos  93  01-14    Liver panel trend:  TBili <0.2   /   AST 55   /   ALT 25   /   AlkP 93   /   Tptn 6.9   /   Alb 3.7    /   DBili --      01-14  TBili <0.2   /   AST 37   /   ALT 25   /   AlkP 133   /   Tptn 9.1   /   Alb 5.0    /   DBili --      01-13      PT/INR - ( 13 Jan 2020 17:58 )   PT: 13.40 sec;   INR: 1.17 ratio         PTT - ( 13 Jan 2020 17:58 )  PTT:33.2 sec        Radiology: Patient is a 34y old  Female who presents with a chief complaint of DKA (14 Jan 2020 08:43)      Admitted on: 01-13-20  HPI:  35 y/o F with PMHx DM type 1 on insulin and metformin [place] [facility] [place] for SOB, nausea, vomiting, abd pain for 2 days. Pt states has not taken her insulin for 2 days. Per pt she moved to [place] a month ago and had trouble getting Insulin but two days ago pt ran out of insulin. In ED pt was found to have HAGMA secondary to DKA along with Hyperkalemia upto 7.2 . EKG consistent with Hyperacute T waves. In ED pt recieved two boluses of 2  liters of LR along with calcium gluconate. (13 Jan 2020 19:33)  She was admitted for 12 times in the past for DKA. Endorsed that she is not checking her FS regularly at home.       PAST MEDICAL & SURGICAL HISTORY:  Diabetes      FAMILY HISTORY:  Denies any family history    Social History:  Tobacco: No  Alcohol: No  Drugs: no    Home Medications:  HumaLOG 100 units/mL subcutaneous solution: 10 unit(s) subcutaneous 3 times a day (13 Jan 2020 19:38)  insulin glargine 100 units/mL subcutaneous solution: 25 unit(s) subcutaneous once a day (at bedtime) (13 Jan 2020 19:39)  metFORMIN 500 mg oral tablet: 1 tab(s) orally 2 times a day (13 Jan 2020 19:39)    MEDICATIONS  (STANDING):  ALBUTerol    0.083% 2.5 milliGRAM(s) Nebulizer every 6 hours  chlorhexidine 4% Liquid 1 Application(s) Topical <User Schedule>  dextrose 5% + sodium chloride 0.45%. 1000 milliLiter(s) (250 mL/Hr) IV Continuous <Continuous>  heparin  Injectable 5000 Unit(s) SubCutaneous every 8 hours  influenza   Vaccine 0.5 milliLiter(s) IntraMuscular once  insulin regular Infusion 2 Unit(s)/Hr (2 mL/Hr) IV Continuous <Continuous>  pantoprazole  Injectable 40 milliGRAM(s) IV Push daily  sodium bicarbonate  Infusion 0.193 mEq/kG/Hr (75 mL/Hr) IV Continuous <Continuous>    MEDICATIONS  (PRN):      Allergies  No Known Allergies      Review of Systems:   Constitutional:  No Fever, No Chills  ENT/Mouth:  No Hearing Changes,  No Difficulty Swallowing  Eyes:  No Eye Pain, No Vision Changes  Cardiovascular:  No Chest Pain, No Palpitations  Respiratory:  No Cough, No Dyspnea  Gastrointestinal:  As described in HPI  Musculoskeletal:  No Joint Swelling, No Back Pain  Skin:  No Skin Lesions, No Jaundice  Neuro:  No Syncope, No Dizziness  Heme/Lymph:  No Bruising, No Bleeding.          Physical Examination:  T(C): 36.9 (01-14-20 @ 08:00), Max: 37.1 (01-14-20 @ 00:00)  HR: 90 (01-14-20 @ 08:00) (90 - 120)  BP: 83/47 (01-14-20 @ 06:00) (83/47 - 126/68)  RR: 13 (01-14-20 @ 08:00) (13 - 31)  SpO2: 100% (01-14-20 @ 08:00) (100% - 100%)  Height (cm): 160 (01-13-20 @ 21:30)  Weight (kg): 58.4 (01-13-20 @ 21:30)    01-13-20 @ 07:01  -  01-14-20 @ 07:00  --------------------------------------------------------  IN: 2112 mL / OUT: 1200 mL / NET: 912 mL        Constitutional: No acute distress.  Eyes:. Conjunctivae are clear, Sclera is non-icteric.  Ears Nose and Throat: The external ears are normal appearing,  Oral mucosa is pink and moist.  Respiratory:  No signs of respiratory distress. Lung sounds are clear bilaterally.  Cardiovascular:  S1 S2, Regular rate and rhythm.  GI: Abdomen is soft, symmetric, and non-tender without distention. There are no visible lesions or scars. Bowel sounds are present and normoactive in all four quadrants. No masses, hepatomegaly, or splenomegaly are noted.   Neuro: No Tremor, No involuntary movements          Data:                        10.9   17.78 )-----------( 351      ( 14 Jan 2020 04:45 )             34.8     Hgb Trend:  10.9  01-14-20 @ 04:45  11.0  01-14-20 @ 02:40  13.8  01-13-20 @ 17:07        01-14    140  |  111<H>  |  16  ----------------------------<  227<H>  5.0   |  12<L>  |  1.1    Ca    9.3      14 Jan 2020 04:45  Mg     2.9     01-13    TPro  6.9  /  Alb  3.7  /  TBili  <0.2  /  DBili  x   /  AST  55<H>  /  ALT  25  /  AlkPhos  93  01-14    Liver panel trend:  TBili <0.2   /   AST 55   /   ALT 25   /   AlkP 93   /   Tptn 6.9   /   Alb 3.7    /   DBili --      01-14  TBili <0.2   /   AST 37   /   ALT 25   /   AlkP 133   /   Tptn 9.1   /   Alb 5.0    /   DBili --      01-13      PT/INR - ( 13 Jan 2020 17:58 )   PT: 13.40 sec;   INR: 1.17 ratio         PTT - ( 13 Jan 2020 17:58 )  PTT:33.2 sec        Radiology:

## 2020-01-14 NOTE — PROGRESS NOTE ADULT - ASSESSMENT
33 yo F with PMHx of DM Type I, noncompliant, presents to the ED with headache, dizziness, generalized weakness, substernal chest pain, found to have HAGMA, ketones >9.0, K of 7.2 admitted for DKA.    #HAGMA due to DKA  - improving; AG 37 on admission, currently 17  - ABG - 7.22, HCO3 12, lactate 2.0  -   - c/w D5 1/2 NS at 250 ml & insulin drip 2 U  - Initiated on clears as tolerated  - Repeat ABG, serial BMPs  - FS q 1 hr    #Hyperkalemia due to DKA  - s/p Ca Gluconate   - Resolved; 7.2 on admission, currently 5.0  - EKG improved  - serial BMPs    #ALHAJI  - Cr 1.7 on admission, currently 1.1 (no baseline available)  - serial BMPs    #Leukocytosis likely due to uncontrolled DM  - 25 on admission, trending down, currently 17  - Pt remains afebrile, no clinical signs of infection  - UA negative, blood/urine Cx pending    #Uncontrolled DM Type I  - Endocrine recs appreciated - cont to monitor with serial BMPs q6, c/w insulin, will reduce insulin if FS <200, initiate diet    DVT ppx: HSQ 5000 U SQ q 8h  GI ppx: Protonix 40 mg IV q daily  Diet: Clear liquids  Activity: Bedrest  Dispo: Home when improved  Code status: Full code 33 yo F with PMHx of DM Type I, noncompliant, presents to the ED with headache, dizziness, generalized weakness, substernal chest pain, found to have HAGMA, ketones >9.0, K of 7.2 admitted for DKA.    # DKA with HAGMA:   - improving; AG 37 on admission, currently 17  - ABG - 7.22, HCO3 12, lactate 2.0  -   - c/w D5 1/2 NS at 250 ml   - c/w insulin drip 2 U and f/u AG  - Initiated on clears as tolerated  - Repeat ABG, serial BMPs  - FS q 1 hr    #Hyperkalemia due to DKA  - s/p Ca Gluconate   - Resolved; 7.2 on admission, currently 5.0  - EKG: resolved hyperacute T waves  - serial BMPs    #ALHAJI  - Cr 1.7 on admission, currently 1.1 (no baseline available)  - most likely pre renal    #Leukocytosis likely due to uncontrolled DM  - 25 on admission, trending down, currently 17  - Pt remains afebrile, no clinical signs of infection  - UA negative, blood/urine Cx pending    #Uncontrolled DM Type I  - Endocrine recs appreciated -  - consider bridging to sc lantus once AG closed    DVT ppx: HSQ 5000 U SQ q 8h  GI ppx: Protonix 40 mg IV q daily  Diet: Clear liquids  Activity: Bedrest  Dispo: downgrade to regular floor once AG closed and able to tolerate oral intake  Code status: Full code 33 yo F with PMHx of DM Type I, noncompliant, presents to the ED with headache, dizziness, generalized weakness, substernal chest pain, found to have HAGMA, ketones >9.0, K of 7.2 admitted for DKA.    # DKA with HAGMA:   - improving; AG 37 on admission, currently 17  - ABG - 7.22, HCO3 12, lactate 2.0  -   - c/w D5 1/2 NS at 250 ml   - c/w insulin drip 3 U and f/u AG  - Initiated on clears as tolerated  - Repeat ABG, serial BMPs  - FS q 1 hr    #Hyperkalemia due to DKA  - s/p Ca Gluconate   - Resolved; 7.2 on admission, currently 5.0  - EKG: resolved hyperacute T waves  - serial BMPs    #ALHAJI  - Cr 1.7 on admission, currently 1.1 (no baseline available)  - most likely pre renal    #Leukocytosis likely due to uncontrolled DM  - 25 on admission, trending down, currently 17  - Pt remains afebrile, no clinical signs of infection  - UA negative, blood/urine Cx pending    #Uncontrolled DM Type I  - Endocrine recs appreciated   - consider bridging to sc lantus once AG closed    DVT ppx: HSQ 5000 U SQ q 8h  GI ppx: Protonix 40 mg IV q daily  Diet: Clear liquids  Activity: Bedrest  Dispo: downgrade to regular floor once AG closed and able to tolerate oral intake  Code status: Full code

## 2020-01-14 NOTE — CONSULT NOTE ADULT - ATTENDING COMMENTS
Sore throat for 1 week, denies fever, + ear pain, pt on amoxicillin for sinus infection by pmd
when stabilized, try 16 units glargine A.M., and 16 units bedtime, try lispro 10 units before each meal. when she goes home, try basaglar kwik pen, as basal insulin, and admelog solostar as lispro before meals, also needs a box of pen needles, and freestyle lite test strips. please schedule diabetes clinic appointment [phone], as she will be seen there, will try and get continuous glucose monitor, and possibly insulin pump, if patient might be interested.

## 2020-01-14 NOTE — PROGRESS NOTE ADULT - SUBJECTIVE AND OBJECTIVE BOX
Patient is a 34y old female with a PMHx DM Type I, noncompliant, who presents with dizziness, headache, and generalized weakness for 2 days. Pt also reports substernal chest pain that worsens when speaking. She states she has not taken insulin since Saturday due to difficulties renewing her subscription after she moved from [place] 1 month ago. She states she sees an endocrinologist, Dr. Salgado. She states that since her diagnosis of DM Type I in , she has been admitted to the ICU 12 times with DKA.  In the ED, she was found to have HAGMA (37), ketones >9.0 and diagnosed with DKA. K was 7.2 and EKG showed hyperacute T waves. She received two boluses of 2 L LR, as well as calcium gluconate.  This morning, pt reports she feels significantly better, her weakness and dizziness have resolved, as well as her chest pain. She endorses a mild headache with minimal relief with Tylenol. She also experiences shortness of breath occasionally, currently she is on a venti mask.      INTERVAL HPI/OVERNIGHT EVENTS:  ICU Vital Signs Last 24 Hrs  T(C): 36.9 (2020 08:00), Max: 37.1 (2020 00:00)  T(F): 98.5 (2020 08:00), Max: 98.7 (2020 00:00)  HR: 90 (2020 08:00) (90 - 120)  BP: 83/47 (2020 06:00) (83/47 - 126/68)  BP(mean): 66 (2020 06:00) (62 - 88)  ABP: --  ABP(mean): --  RR: 13 (2020 08:00) (13 - 31)  SpO2: 100% (2020 08:00) (100% - 100%)    I&O's Summary    2020 07:01  -  2020 07:00  --------------------------------------------------------  IN: 2112 mL / OUT: 1200 mL / NET: 912 mL          LABS:                        10.9   17.78 )-----------( 351      ( 2020 04:45 )             34.8     01-14    140  |  111<H>  |  16  ----------------------------<  227<H>  5.0   |  12<L>  |  1.1    Ca    9.3      2020 04:45  Mg     2.9     01-13    TPro  6.9  /  Alb  3.7  /  TBili  <0.2  /  DBili  x   /  AST  55<H>  /  ALT  25  /  AlkPhos  93  01-14    PT/INR - ( 2020 17:58 )   PT: 13.40 sec;   INR: 1.17 ratio         PTT - ( 2020 17:58 )  PTT:33.2 sec  Urinalysis Basic - ( 2020 17:07 )    Color: Colorless / Appearance: Clear / S.020 / pH: x  Gluc: x / Ketone: Large  / Bili: Negative / Urobili: <2 mg/dL   Blood: x / Protein: 30 mg/dL / Nitrite: Negative   Leuk Esterase: Negative / RBC: 0 /HPF / WBC 0 /HPF   Sq Epi: x / Non Sq Epi: 1 /HPF / Bacteria: Negative      CAPILLARY BLOOD GLUCOSE      POCT Blood Glucose.: 217 mg/dL (2020 09:34)  POCT Blood Glucose.: 200 mg/dL (2020 08:20)  POCT Blood Glucose.: 158 mg/dL (2020 06:11)  POCT Blood Glucose.: 182 mg/dL (2020 04:47)  POCT Blood Glucose.: 216 mg/dL (2020 03:32)  POCT Blood Glucose.: 186 mg/dL (2020 02:23)  POCT Blood Glucose.: 205 mg/dL (2020 00:56)  POCT Blood Glucose.: 375 mg/dL (2020 23:33)  POCT Blood Glucose.: 410 mg/dL (2020 22:42)  POCT Blood Glucose.: 489 mg/dL (2020 21:34)  POCT Blood Glucose.: 556 mg/dL (2020 20:56)  POCT Blood Glucose.: 585 mg/dL (2020 19:55)  POCT Blood Glucose.: >600 mg/dL (2020 19:16)  POCT Blood Glucose.: >600 mg/dL (2020 16:21)    ABG - ( 2020 02:10 )  pH, Arterial: 7.22  pH, Blood: x     /  pCO2: 20    /  pO2: 149   / HCO3: 8     / Base Excess: -17.8 /  SaO2: 99                  RADIOLOGY & ADDITIONAL TESTS:    Consultant(s) Notes Reviewed:  [x ] YES  [ ] NO    MEDICATIONS  (STANDING):  ALBUTerol    0.083% 2.5 milliGRAM(s) Nebulizer every 6 hours  chlorhexidine 4% Liquid 1 Application(s) Topical <User Schedule>  dextrose 5% + sodium chloride 0.45%. 1000 milliLiter(s) (250 mL/Hr) IV Continuous <Continuous>  heparin  Injectable 5000 Unit(s) SubCutaneous every 8 hours  influenza   Vaccine 0.5 milliLiter(s) IntraMuscular once  insulin regular Infusion 2 Unit(s)/Hr (2 mL/Hr) IV Continuous <Continuous>  pantoprazole  Injectable 40 milliGRAM(s) IV Push daily  sodium bicarbonate  Infusion 0.193 mEq/kG/Hr (75 mL/Hr) IV Continuous <Continuous>    MEDICATIONS  (PRN):      PHYSICAL EXAM:  GENERAL: well built, well nourished, sleeping comfortably in bed, on venti mask  NERVOUS SYSTEM:  AAO X3, CN II-XII grossly intact, Motor Strength 5/5 B/L upper and lower extremities; sensory intact  CHEST/LUNG: B/L good air entry; No rales, rhonchi, or wheezing  HEART: S1S2 normal, no S3, Regular rate and rhythm; No murmurs, rubs, or gallops  ABDOMEN: Soft, Nontender, Nondistended; Bowel sounds present  EXTREMITIES:  2+ Peripheral Pulses, No clubbing, cyanosis, or edema      Care Discussed with Consultants/Other Providers [ x] YES  [ ] NO Patient is a 34y old female with a PMHx DM Type I, noncompliant, who presents with dizziness, headache, and generalized weakness for 2 days. Pt also reports substernal chest pain that worsens when speaking. She states she has not taken insulin since Saturday due to difficulties renewing her subscription after she moved from [place] 1 month ago. She states she sees an endocrinologist, Dr. Salgado. She states that since her diagnosis of DM Type I in , she has been admitted to the ICU 12 times with DKA.  In the ED, she was found to have HAGMA (37), ketones >9.0 and diagnosed with DKA. K was 7.2 and EKG showed hyperacute T waves. She received two boluses of 4 L LR, as well as calcium gluconate.  This morning, pt reports she feels significantly better, her weakness and dizziness have resolved, as well as her chest pain. She endorses a mild headache with minimal relief with Tylenol. She also experiences shortness of breath occasionally, currently she is on a venti mask.      INTERVAL HPI/OVERNIGHT EVENTS:  ICU Vital Signs Last 24 Hrs  T(C): 36.9 (2020 08:00), Max: 37.1 (2020 00:00)  T(F): 98.5 (2020 08:00), Max: 98.7 (2020 00:00)  HR: 90 (2020 08:00) (90 - 120)  BP: 83/47 (2020 06:00) (83/47 - 126/68)  BP(mean): 66 (2020 06:00) (62 - 88)  ABP: --  ABP(mean): --  RR: 13 (2020 08:00) (13 - 31)  SpO2: 100% (2020 08:00) (100% - 100%)    I&O's Summary    2020 07:01  -  2020 07:00  --------------------------------------------------------  IN: 2112 mL / OUT: 1200 mL / NET: 912 mL          LABS:                        10.9   17.78 )-----------( 351      ( 2020 04:45 )             34.8     01-14    140  |  111<H>  |  16  ----------------------------<  227<H>  5.0   |  12<L>  |  1.1    Ca    9.3      2020 04:45  Mg     2.9     01-13    TPro  6.9  /  Alb  3.7  /  TBili  <0.2  /  DBili  x   /  AST  55<H>  /  ALT  25  /  AlkPhos  93  01-14    PT/INR - ( 2020 17:58 )   PT: 13.40 sec;   INR: 1.17 ratio         PTT - ( 2020 17:58 )  PTT:33.2 sec  Urinalysis Basic - ( 2020 17:07 )    Color: Colorless / Appearance: Clear / S.020 / pH: x  Gluc: x / Ketone: Large  / Bili: Negative / Urobili: <2 mg/dL   Blood: x / Protein: 30 mg/dL / Nitrite: Negative   Leuk Esterase: Negative / RBC: 0 /HPF / WBC 0 /HPF   Sq Epi: x / Non Sq Epi: 1 /HPF / Bacteria: Negative      CAPILLARY BLOOD GLUCOSE      POCT Blood Glucose.: 217 mg/dL (2020 09:34)  POCT Blood Glucose.: 200 mg/dL (2020 08:20)  POCT Blood Glucose.: 158 mg/dL (2020 06:11)  POCT Blood Glucose.: 182 mg/dL (2020 04:47)  POCT Blood Glucose.: 216 mg/dL (2020 03:32)  POCT Blood Glucose.: 186 mg/dL (2020 02:23)  POCT Blood Glucose.: 205 mg/dL (2020 00:56)  POCT Blood Glucose.: 375 mg/dL (2020 23:33)  POCT Blood Glucose.: 410 mg/dL (2020 22:42)  POCT Blood Glucose.: 489 mg/dL (2020 21:34)  POCT Blood Glucose.: 556 mg/dL (2020 20:56)  POCT Blood Glucose.: 585 mg/dL (2020 19:55)  POCT Blood Glucose.: >600 mg/dL (2020 19:16)  POCT Blood Glucose.: >600 mg/dL (2020 16:21)    ABG - ( 2020 02:10 )  pH, Arterial: 7.22  pH, Blood: x     /  pCO2: 20    /  pO2: 149   / HCO3: 8     / Base Excess: -17.8 /  SaO2: 99                  RADIOLOGY & ADDITIONAL TESTS:    Consultant(s) Notes Reviewed:  [x ] YES  [ ] NO    MEDICATIONS  (STANDING):  ALBUTerol    0.083% 2.5 milliGRAM(s) Nebulizer every 6 hours  chlorhexidine 4% Liquid 1 Application(s) Topical <User Schedule>  dextrose 5% + sodium chloride 0.45%. 1000 milliLiter(s) (250 mL/Hr) IV Continuous <Continuous>  heparin  Injectable 5000 Unit(s) SubCutaneous every 8 hours  influenza   Vaccine 0.5 milliLiter(s) IntraMuscular once  insulin regular Infusion 2 Unit(s)/Hr (2 mL/Hr) IV Continuous <Continuous>  pantoprazole  Injectable 40 milliGRAM(s) IV Push daily  sodium bicarbonate  Infusion 0.193 mEq/kG/Hr (75 mL/Hr) IV Continuous <Continuous>    MEDICATIONS  (PRN):      PHYSICAL EXAM:  GENERAL: well built, well nourished, sleeping comfortably in bed, on venti mask  NERVOUS SYSTEM:  AAO X3, CN II-XII grossly intact, Motor Strength 5/5 B/L upper and lower extremities; sensory intact  CHEST/LUNG: B/L good air entry; No rales, rhonchi, or wheezing  HEART: S1S2 normal, no S3, Regular rate and rhythm; No murmurs, rubs, or gallops  ABDOMEN: Soft, Nontender, Nondistended; Bowel sounds present  EXTREMITIES:  2+ Peripheral Pulses, No clubbing, cyanosis, or edema      Care Discussed with Consultants/Other Providers [ x] YES  [ ] NO

## 2020-01-15 LAB
ANION GAP SERPL CALC-SCNC: 11 MMOL/L — SIGNIFICANT CHANGE UP (ref 7–14)
ANION GAP SERPL CALC-SCNC: 11 MMOL/L — SIGNIFICANT CHANGE UP (ref 7–14)
ANION GAP SERPL CALC-SCNC: 12 MMOL/L — SIGNIFICANT CHANGE UP (ref 7–14)
ANION GAP SERPL CALC-SCNC: 16 MMOL/L — HIGH (ref 7–14)
BASOPHILS # BLD AUTO: 0 K/UL — SIGNIFICANT CHANGE UP (ref 0–0.2)
BASOPHILS NFR BLD AUTO: 0 % — SIGNIFICANT CHANGE UP (ref 0–1)
BUN SERPL-MCNC: 4 MG/DL — LOW (ref 10–20)
BUN SERPL-MCNC: 5 MG/DL — LOW (ref 10–20)
BUN SERPL-MCNC: 6 MG/DL — LOW (ref 10–20)
BUN SERPL-MCNC: 6 MG/DL — LOW (ref 10–20)
CALCIUM SERPL-MCNC: 8.2 MG/DL — LOW (ref 8.5–10.1)
CALCIUM SERPL-MCNC: 8.3 MG/DL — LOW (ref 8.5–10.1)
CALCIUM SERPL-MCNC: 8.5 MG/DL — SIGNIFICANT CHANGE UP (ref 8.5–10.1)
CALCIUM SERPL-MCNC: 9 MG/DL — SIGNIFICANT CHANGE UP (ref 8.5–10.1)
CHLORIDE SERPL-SCNC: 107 MMOL/L — SIGNIFICANT CHANGE UP (ref 98–110)
CHLORIDE SERPL-SCNC: 108 MMOL/L — SIGNIFICANT CHANGE UP (ref 98–110)
CHLORIDE SERPL-SCNC: 110 MMOL/L — SIGNIFICANT CHANGE UP (ref 98–110)
CHLORIDE SERPL-SCNC: 113 MMOL/L — HIGH (ref 98–110)
CO2 SERPL-SCNC: 15 MMOL/L — LOW (ref 17–32)
CO2 SERPL-SCNC: 15 MMOL/L — LOW (ref 17–32)
CO2 SERPL-SCNC: 16 MMOL/L — LOW (ref 17–32)
CO2 SERPL-SCNC: 17 MMOL/L — SIGNIFICANT CHANGE UP (ref 17–32)
CREAT SERPL-MCNC: 0.8 MG/DL — SIGNIFICANT CHANGE UP (ref 0.7–1.5)
CREAT SERPL-MCNC: 0.9 MG/DL — SIGNIFICANT CHANGE UP (ref 0.7–1.5)
CREAT SERPL-MCNC: 0.9 MG/DL — SIGNIFICANT CHANGE UP (ref 0.7–1.5)
CREAT SERPL-MCNC: 1 MG/DL — SIGNIFICANT CHANGE UP (ref 0.7–1.5)
EOSINOPHIL # BLD AUTO: 0 K/UL — SIGNIFICANT CHANGE UP (ref 0–0.7)
EOSINOPHIL NFR BLD AUTO: 0 % — SIGNIFICANT CHANGE UP (ref 0–8)
GLUCOSE BLDC GLUCOMTR-MCNC: 109 MG/DL — HIGH (ref 70–99)
GLUCOSE BLDC GLUCOMTR-MCNC: 115 MG/DL — HIGH (ref 70–99)
GLUCOSE BLDC GLUCOMTR-MCNC: 118 MG/DL — HIGH (ref 70–99)
GLUCOSE BLDC GLUCOMTR-MCNC: 120 MG/DL — HIGH (ref 70–99)
GLUCOSE BLDC GLUCOMTR-MCNC: 125 MG/DL — HIGH (ref 70–99)
GLUCOSE BLDC GLUCOMTR-MCNC: 133 MG/DL — HIGH (ref 70–99)
GLUCOSE BLDC GLUCOMTR-MCNC: 146 MG/DL — HIGH (ref 70–99)
GLUCOSE BLDC GLUCOMTR-MCNC: 194 MG/DL — HIGH (ref 70–99)
GLUCOSE BLDC GLUCOMTR-MCNC: 197 MG/DL — HIGH (ref 70–99)
GLUCOSE BLDC GLUCOMTR-MCNC: 211 MG/DL — HIGH (ref 70–99)
GLUCOSE BLDC GLUCOMTR-MCNC: 223 MG/DL — HIGH (ref 70–99)
GLUCOSE BLDC GLUCOMTR-MCNC: 234 MG/DL — HIGH (ref 70–99)
GLUCOSE BLDC GLUCOMTR-MCNC: 315 MG/DL — HIGH (ref 70–99)
GLUCOSE BLDC GLUCOMTR-MCNC: 63 MG/DL — LOW (ref 70–99)
GLUCOSE BLDC GLUCOMTR-MCNC: 94 MG/DL — SIGNIFICANT CHANGE UP (ref 70–99)
GLUCOSE BLDC GLUCOMTR-MCNC: 99 MG/DL — SIGNIFICANT CHANGE UP (ref 70–99)
GLUCOSE SERPL-MCNC: 137 MG/DL — HIGH (ref 70–99)
GLUCOSE SERPL-MCNC: 150 MG/DL — HIGH (ref 70–99)
GLUCOSE SERPL-MCNC: 206 MG/DL — HIGH (ref 70–99)
GLUCOSE SERPL-MCNC: 298 MG/DL — HIGH (ref 70–99)
HCT VFR BLD CALC: 32.7 % — LOW (ref 37–47)
HGB BLD-MCNC: 10.5 G/DL — LOW (ref 12–16)
LYMPHOCYTES # BLD AUTO: 1.22 K/UL — SIGNIFICANT CHANGE UP (ref 1.2–3.4)
LYMPHOCYTES # BLD AUTO: 17.4 % — LOW (ref 20.5–51.1)
MAGNESIUM SERPL-MCNC: 1.8 MG/DL — SIGNIFICANT CHANGE UP (ref 1.8–2.4)
MCHC RBC-ENTMCNC: 27.9 PG — SIGNIFICANT CHANGE UP (ref 27–31)
MCHC RBC-ENTMCNC: 32.1 G/DL — SIGNIFICANT CHANGE UP (ref 32–37)
MCV RBC AUTO: 87 FL — SIGNIFICANT CHANGE UP (ref 81–99)
MONOCYTES # BLD AUTO: 0.3 K/UL — SIGNIFICANT CHANGE UP (ref 0.1–0.6)
MONOCYTES NFR BLD AUTO: 4.3 % — SIGNIFICANT CHANGE UP (ref 1.7–9.3)
NEUTROPHILS # BLD AUTO: 5.44 K/UL — SIGNIFICANT CHANGE UP (ref 1.4–6.5)
NEUTROPHILS NFR BLD AUTO: 77.4 % — HIGH (ref 42.2–75.2)
PLATELET # BLD AUTO: 285 K/UL — SIGNIFICANT CHANGE UP (ref 130–400)
POTASSIUM SERPL-MCNC: 4.1 MMOL/L — SIGNIFICANT CHANGE UP (ref 3.5–5)
POTASSIUM SERPL-MCNC: 4.4 MMOL/L — SIGNIFICANT CHANGE UP (ref 3.5–5)
POTASSIUM SERPL-MCNC: 4.4 MMOL/L — SIGNIFICANT CHANGE UP (ref 3.5–5)
POTASSIUM SERPL-MCNC: 4.5 MMOL/L — SIGNIFICANT CHANGE UP (ref 3.5–5)
POTASSIUM SERPL-SCNC: 4.1 MMOL/L — SIGNIFICANT CHANGE UP (ref 3.5–5)
POTASSIUM SERPL-SCNC: 4.4 MMOL/L — SIGNIFICANT CHANGE UP (ref 3.5–5)
POTASSIUM SERPL-SCNC: 4.4 MMOL/L — SIGNIFICANT CHANGE UP (ref 3.5–5)
POTASSIUM SERPL-SCNC: 4.5 MMOL/L — SIGNIFICANT CHANGE UP (ref 3.5–5)
RBC # BLD: 3.76 M/UL — LOW (ref 4.2–5.4)
RBC # FLD: 14.1 % — SIGNIFICANT CHANGE UP (ref 11.5–14.5)
SODIUM SERPL-SCNC: 136 MMOL/L — SIGNIFICANT CHANGE UP (ref 135–146)
SODIUM SERPL-SCNC: 136 MMOL/L — SIGNIFICANT CHANGE UP (ref 135–146)
SODIUM SERPL-SCNC: 139 MMOL/L — SIGNIFICANT CHANGE UP (ref 135–146)
SODIUM SERPL-SCNC: 140 MMOL/L — SIGNIFICANT CHANGE UP (ref 135–146)
WBC # BLD: 7.03 K/UL — SIGNIFICANT CHANGE UP (ref 4.8–10.8)
WBC # FLD AUTO: 7.03 K/UL — SIGNIFICANT CHANGE UP (ref 4.8–10.8)

## 2020-01-15 RX ORDER — SODIUM CHLORIDE 9 MG/ML
1000 INJECTION, SOLUTION INTRAVENOUS
Refills: 0 | Status: DISCONTINUED | OUTPATIENT
Start: 2020-01-15 | End: 2020-01-16

## 2020-01-15 RX ORDER — INSULIN LISPRO 100/ML
4 VIAL (ML) SUBCUTANEOUS
Refills: 0 | Status: DISCONTINUED | OUTPATIENT
Start: 2020-01-15 | End: 2020-01-15

## 2020-01-15 RX ORDER — SODIUM CHLORIDE 9 MG/ML
1000 INJECTION, SOLUTION INTRAVENOUS
Refills: 0 | Status: DISCONTINUED | OUTPATIENT
Start: 2020-01-15 | End: 2020-01-15

## 2020-01-15 RX ORDER — INSULIN LISPRO 100/ML
6 VIAL (ML) SUBCUTANEOUS
Refills: 0 | Status: DISCONTINUED | OUTPATIENT
Start: 2020-01-15 | End: 2020-01-15

## 2020-01-15 RX ORDER — DEXTROSE 50 % IN WATER 50 %
12.5 SYRINGE (ML) INTRAVENOUS ONCE
Refills: 0 | Status: DISCONTINUED | OUTPATIENT
Start: 2020-01-15 | End: 2020-01-17

## 2020-01-15 RX ORDER — INSULIN HUMAN 100 [IU]/ML
3 INJECTION, SOLUTION SUBCUTANEOUS
Qty: 100 | Refills: 0 | Status: DISCONTINUED | OUTPATIENT
Start: 2020-01-15 | End: 2020-01-15

## 2020-01-15 RX ORDER — DEXTROSE 50 % IN WATER 50 %
25 SYRINGE (ML) INTRAVENOUS ONCE
Refills: 0 | Status: DISCONTINUED | OUTPATIENT
Start: 2020-01-15 | End: 2020-01-17

## 2020-01-15 RX ORDER — INSULIN HUMAN 100 [IU]/ML
3 INJECTION, SOLUTION SUBCUTANEOUS
Qty: 50 | Refills: 0 | Status: DISCONTINUED | OUTPATIENT
Start: 2020-01-15 | End: 2020-01-15

## 2020-01-15 RX ORDER — INSULIN GLARGINE 100 [IU]/ML
17 INJECTION, SOLUTION SUBCUTANEOUS EVERY MORNING
Refills: 0 | Status: DISCONTINUED | OUTPATIENT
Start: 2020-01-15 | End: 2020-01-15

## 2020-01-15 RX ORDER — DEXTROSE 50 % IN WATER 50 %
50 SYRINGE (ML) INTRAVENOUS ONCE
Refills: 0 | Status: DISCONTINUED | OUTPATIENT
Start: 2020-01-15 | End: 2020-01-17

## 2020-01-15 RX ORDER — DEXTROSE 50 % IN WATER 50 %
15 SYRINGE (ML) INTRAVENOUS ONCE
Refills: 0 | Status: DISCONTINUED | OUTPATIENT
Start: 2020-01-15 | End: 2020-01-17

## 2020-01-15 RX ORDER — INSULIN HUMAN 100 [IU]/ML
2 INJECTION, SOLUTION SUBCUTANEOUS
Qty: 100 | Refills: 0 | Status: DISCONTINUED | OUTPATIENT
Start: 2020-01-15 | End: 2020-01-16

## 2020-01-15 RX ORDER — GLUCAGON INJECTION, SOLUTION 0.5 MG/.1ML
1 INJECTION, SOLUTION SUBCUTANEOUS ONCE
Refills: 0 | Status: DISCONTINUED | OUTPATIENT
Start: 2020-01-15 | End: 2020-01-17

## 2020-01-15 RX ADMIN — INSULIN HUMAN 3 UNIT(S)/HR: 100 INJECTION, SOLUTION SUBCUTANEOUS at 15:36

## 2020-01-15 RX ADMIN — PANTOPRAZOLE SODIUM 40 MILLIGRAM(S): 20 TABLET, DELAYED RELEASE ORAL at 13:07

## 2020-01-15 RX ADMIN — CHLORHEXIDINE GLUCONATE 1 APPLICATION(S): 213 SOLUTION TOPICAL at 05:24

## 2020-01-15 RX ADMIN — SODIUM CHLORIDE 150 MILLILITER(S): 9 INJECTION, SOLUTION INTRAVENOUS at 20:35

## 2020-01-15 RX ADMIN — Medication 6 UNIT(S): at 13:07

## 2020-01-15 RX ADMIN — HEPARIN SODIUM 5000 UNIT(S): 5000 INJECTION INTRAVENOUS; SUBCUTANEOUS at 05:19

## 2020-01-15 RX ADMIN — INSULIN GLARGINE 17 UNIT(S): 100 INJECTION, SOLUTION SUBCUTANEOUS at 07:50

## 2020-01-15 RX ADMIN — HEPARIN SODIUM 5000 UNIT(S): 5000 INJECTION INTRAVENOUS; SUBCUTANEOUS at 15:36

## 2020-01-15 NOTE — PROGRESS NOTE ADULT - ASSESSMENT
IMPRESSION:    SEVERE DKA/ NON COMPLIANT WITH INSULIN BETTER  ALHAJI improving      PLAN:    CNS: AVOID DEPRESSANTS    HEENT:  Oral care    PULMONARY:  HOB @ 45 degrees     CARDIOVASCULAR: IVF. 1/2 NS.    GI: GI prophylaxis                                          Feeding carb consistent    RENAL:  F/u  lytes.  Correct as needed. accurate I/O, repeat CP at 11    INFECTIOUS DISEASE: cx -ve    HEMATOLOGICAL:  DVT prophylaxis.    ENDOCRINE:  Follow up FS. Endo input appreciated. lantus am/pm as / endo    CODE STATUS: FULL CODE    downgrade to medical floor in the afternoon.

## 2020-01-15 NOTE — PROGRESS NOTE ADULT - SUBJECTIVE AND OBJECTIVE BOX
Over Night Events:  off the insulin drip. given lantus      ROS:  See HPI    PHYSICAL EXAM    ICU Vital Signs Last 24 Hrs  T(C): 36.1 (15 Ari 2020 06:00), Max: 37.1 (2020 12:00)  T(F): 97 (15 Ari 2020 06:00), Max: 98.7 (2020 12:00)  HR: 68 (15 Ari 2020 06:00) (68 - 110)  BP: 100/61 (15 Ari 2020 06:00) (81/48 - 109/58)  BP(mean): 75 (15 Ari 2020 06:00) (53 - 79)  ABP: --  ABP(mean): --  RR: 12 (15 Ari 2020 06:00) (12 - 19)  SpO2: 96% (15 Ari 2020 06:00) (96% - 98%)      General: NARD  HEENT: ALMA             Lymph Nodes: No cervical LN   Lungs: Bilateral BS  Cardiovascular: Regular   Abdomen: Soft, Positive BS  Extremities: No clubbing   Skin: Warm  Neurological: Non focal       20 @ 07:01  -  01-15-20 @ 07:00  --------------------------------------------------------  IN:    dextrose 5% + sodium chloride 0.45%: 3250 mL    dextrose 5% + sodium chloride 0.45%: 250 mL    dextrose 5% + sodium chloride 0.45%.: 2000 mL    insulin regular Infusion: 23 mL    insulin regular Infusion: 18 mL  Total IN: 5541 mL    OUT:    Voided: 1700 mL  Total OUT: 1700 mL    Total NET: 3841 mL          LABS:                          10.5   7.03  )-----------( 285      ( 15 Ari 2020 04:47 )             32.7                                               01-15    140  |  113<H>  |  5<L>  ----------------------------<  137<H>  4.5   |  16<L>  |  0.9    Ca    8.5      15 Ari 2020 04:47  Phos  2.6     -14  Mg     1.8     01-15    TPro  6.9  /  Alb  3.7  /  TBili  <0.2  /  DBili  x   /  AST  55<H>  /  ALT  25  /  AlkPhos  93  01-14      PT/INR - ( 2020 17:58 )   PT: 13.40 sec;   INR: 1.17 ratio         PTT - ( 2020 17:58 )  PTT:33.2 sec                                       Urinalysis Basic - ( 2020 17:07 )    Color: Colorless / Appearance: Clear / S.020 / pH: x  Gluc: x / Ketone: Large  / Bili: Negative / Urobili: <2 mg/dL   Blood: x / Protein: 30 mg/dL / Nitrite: Negative   Leuk Esterase: Negative / RBC: 0 /HPF / WBC 0 /HPF   Sq Epi: x / Non Sq Epi: 1 /HPF / Bacteria: Negative        CARDIAC MARKERS ( 2020 04:45 )  x     / <0.01 ng/mL / x     / x     / x      CARDIAC MARKERS ( 2020 02:40 )  x     / <0.01 ng/mL / 98 U/L / x     / 1.4 ng/mL  CARDIAC MARKERS ( 2020 17:58 )  x     / <0.01 ng/mL / x     / x     / x                                                LIVER FUNCTIONS - ( 2020 02:40 )  Alb: 3.7 g/dL / Pro: 6.9 g/dL / ALK PHOS: 93 U/L / ALT: 25 U/L / AST: 55 U/L / GGT: x                                                  Culture - Blood (collected 2020 19:10)  Source: .Blood Blood  Preliminary Report (15 Ari 2020 02:02):    No growth to date.                                                                                       ABG - ( 2020 02:10 )  pH, Arterial: 7.22  pH, Blood: x     /  pCO2: 20    /  pO2: 149   / HCO3: 8     / Base Excess: -17.8 /  SaO2: 99                  MEDICATIONS  (STANDING):  ALBUTerol    0.083% 2.5 milliGRAM(s) Nebulizer every 6 hours  chlorhexidine 4% Liquid 1 Application(s) Topical <User Schedule>  dextrose 50% Injectable 12.5 Gram(s) IV Push once  dextrose 50% Injectable 25 Gram(s) IV Push once  dextrose 50% Injectable 25 Gram(s) IV Push once  heparin  Injectable 5000 Unit(s) SubCutaneous every 8 hours  influenza   Vaccine 0.5 milliLiter(s) IntraMuscular once  insulin glargine Injectable (LANTUS) 17 Unit(s) SubCutaneous every morning  insulin lispro Injectable (HumaLOG) 4 Unit(s) SubCutaneous three times a day before meals  pantoprazole  Injectable 40 milliGRAM(s) IV Push daily  sodium chloride 0.45%. 1000 milliLiter(s) (75 mL/Hr) IV Continuous <Continuous>    MEDICATIONS  (PRN):  dextrose 40% Gel 15 Gram(s) Oral once PRN Blood Glucose LESS THAN 70 milliGRAM(s)/deciliter  glucagon  Injectable 1 milliGRAM(s) IntraMuscular once PRN Glucose LESS THAN 70 milligrams/deciliter      Xrays:                                                                                     ECHO Over Night Events:  off the insulin drip. given lantus feels better      ROS:  See HPI    PHYSICAL EXAM    ICU Vital Signs Last 24 Hrs  T(C): 36.1 (15 Ari 2020 06:00), Max: 37.1 (2020 12:00)  T(F): 97 (15 Ari 2020 06:00), Max: 98.7 (2020 12:00)  HR: 68 (15 Ari 2020 06:00) (68 - 110)  BP: 100/61 (15 Ari 2020 06:00) (81/48 - 109/58)  BP(mean): 75 (15 Ari 2020 06:00) (53 - 79)  RR: 12 (15 Ari 2020 06:00) (12 - 19)  SpO2: 96% (15 Ari 2020 06:00) (96% - 98%)      General: NARD  HEENT: ALMA             Lymph Nodes: No cervical LN   Lungs: Bilateral BS  Cardiovascular: Regular   Abdomen: Soft, Positive BS  Extremities: No clubbing   Skin: Warm  Neurological: Non focal       20 @ 07:01  -  01-15-20 @ 07:00  --------------------------------------------------------  IN:    dextrose 5% + sodium chloride 0.45%: 3250 mL    dextrose 5% + sodium chloride 0.45%: 250 mL    dextrose 5% + sodium chloride 0.45%.: 2000 mL    insulin regular Infusion: 23 mL    insulin regular Infusion: 18 mL  Total IN: 5541 mL    OUT:    Voided: 1700 mL  Total OUT: 1700 mL    Total NET: 3841 mL          LABS:                          10.5   7.03  )-----------( 285      ( 15 Ari 2020 04:47 )             32.7                                               01-15    140  |  113<H>  |  5<L>  ----------------------------<  137<H>  4.5   |  16<L>  |  0.9    Ca    8.5      15 Ari 2020 04:47  Phos  2.6     01-14  Mg     1.8     -15    TPro  6.9  /  Alb  3.7  /  TBili  <0.2  /  DBili  x   /  AST  55<H>  /  ALT  25  /  AlkPhos  93  01-14      PT/INR - ( 2020 17:58 )   PT: 13.40 sec;   INR: 1.17 ratio         PTT - ( 2020 17:58 )  PTT:33.2 sec                                       Urinalysis Basic - ( 2020 17:07 )    Color: Colorless / Appearance: Clear / S.020 / pH: x  Gluc: x / Ketone: Large  / Bili: Negative / Urobili: <2 mg/dL   Blood: x / Protein: 30 mg/dL / Nitrite: Negative   Leuk Esterase: Negative / RBC: 0 /HPF / WBC 0 /HPF   Sq Epi: x / Non Sq Epi: 1 /HPF / Bacteria: Negative        CARDIAC MARKERS ( 2020 04:45 )  x     / <0.01 ng/mL / x     / x     / x      CARDIAC MARKERS ( 2020 02:40 )  x     / <0.01 ng/mL / 98 U/L / x     / 1.4 ng/mL  CARDIAC MARKERS ( 2020 17:58 )  x     / <0.01 ng/mL / x     / x     / x                                                LIVER FUNCTIONS - ( 2020 02:40 )  Alb: 3.7 g/dL / Pro: 6.9 g/dL / ALK PHOS: 93 U/L / ALT: 25 U/L / AST: 55 U/L / GGT: x                                                  Culture - Blood (collected 2020 19:10)  Source: .Blood Blood  Preliminary Report (15 Ari 2020 02:02):    No growth to date.                                                                                       ABG - ( 2020 02:10 )  pH, Arterial: 7.22  pH, Blood: x     /  pCO2: 20    /  pO2: 149   / HCO3: 8     / Base Excess: -17.8 /  SaO2: 99                  MEDICATIONS  (STANDING):  ALBUTerol    0.083% 2.5 milliGRAM(s) Nebulizer every 6 hours  chlorhexidine 4% Liquid 1 Application(s) Topical <User Schedule>  dextrose 50% Injectable 12.5 Gram(s) IV Push once  dextrose 50% Injectable 25 Gram(s) IV Push once  dextrose 50% Injectable 25 Gram(s) IV Push once  heparin  Injectable 5000 Unit(s) SubCutaneous every 8 hours  influenza   Vaccine 0.5 milliLiter(s) IntraMuscular once  insulin glargine Injectable (LANTUS) 17 Unit(s) SubCutaneous every morning  insulin lispro Injectable (HumaLOG) 4 Unit(s) SubCutaneous three times a day before meals  pantoprazole  Injectable 40 milliGRAM(s) IV Push daily  sodium chloride 0.45%. 1000 milliLiter(s) (75 mL/Hr) IV Continuous <Continuous>    MEDICATIONS  (PRN):  dextrose 40% Gel 15 Gram(s) Oral once PRN Blood Glucose LESS THAN 70 milliGRAM(s)/deciliter  glucagon  Injectable 1 milliGRAM(s) IntraMuscular once PRN Glucose LESS THAN 70 milligrams/deciliter      Xrays:                                                                                     ECHO

## 2020-01-15 NOTE — CHART NOTE - NSCHARTNOTEFT_GEN_A_CORE
Patient is a 34y old female with a PMHx DM Type I, noncompliant, who presented 1/13 with dizziness, headache, and generalized weakness for 2 days. Pt also reports substernal chest pain that worsens when speaking. She states she has not taken insulin since 1/11 due to difficulties renewing her subscription after she moved from [place] 1 month ago. She states she sees an endocrinologist, Dr. Salgado, on [place]. She states that since her diagnosis of DM Type I in 2014, she has been admitted to the ICU 12 times with DKA.  In the ED, she was found to have HAGMA (37), ketones >9.0 and diagnosed with DKA. K was 7.2 and EKG showed hyperacute T waves. She received two boluses of 4 L LR, as well as calcium gluconate, improving her potassium levels and EKG findings. She was upgraded to CCU, where she received a continuous insulin drip and D5 1/2 NS. Her anion gap closed and fingersticks normalized and she was initiated on Lantus 17 U. Her insulin drip and fluids were d/c'd and she is resuming regular diet, tolerating PO intake well. Her symptoms have improved as well and she can be downgraded.  Her stay was complicated by ALHAJI, her Cr was 1.7 on admission and came down to 0.9. She was also found to have leukocytosis on admission, but no clinical signs of infection. UA was negative and blood cultures are NGTD, leading to the likely cause being longstanding noncompliance to her DM regimen.     INTERVAL HPI/OVERNIGHT EVENTS:  ICU Vital Signs Last 24 Hrs  T(C): 36.1 (15 Ari 2020 08:00), Max: 37.1 (14 Jan 2020 12:00)  T(F): 97 (15 Ari 2020 08:00), Max: 98.7 (14 Jan 2020 12:00)  HR: 90 (15 Ari 2020 08:00) (68 - 98)  BP: 100/61 (15 Ari 2020 06:00) (81/48 - 109/58)  BP(mean): 75 (15 Ari 2020 06:00) (53 - 79)  ABP: --  ABP(mean): --  RR: 12 (15 Ari 2020 06:00) (12 - 19)  SpO2: 96% (15 Ari 2020 06:00) (96% - 98%)    I&O's Summary    14 Jan 2020 07:01  -  15 Ari 2020 07:00  --------------------------------------------------------  IN: 5541 mL / OUT: 1700 mL / NET: 3841 mL    LABS:                        10.5   7.03  )-----------( 285      ( 15 Ari 2020 04:47 )             32.7     01-15    140  |  113<H>  |  5<L>  ----------------------------<  137<H>  4.5   |  16<L>  |  0.9    Ca    8.5      15 Ari 2020 04:47  Phos  2.6     01-14  Mg     1.8     01-15    TPro  6.9  /  Alb  3.7  /  TBili  <0.2  /  DBili  x   /  AST  55<H>  /  ALT  25  /  AlkPhos  93  01-14    PT/INR - ( 13 Jan 2020 17:58 )   PT: 13.40 sec;   INR: 1.17 ratio        PHYSICAL EXAM:  GENERAL: well built, well nourished, resting comfortably in bed on RA  CHEST/LUNG: B/L good air entry; No rales, rhonchi, or wheezing  HEART: S1S2 normal, no S3, Regular rate and rhythm; No murmurs, rubs, or gallops  ABDOMEN: Soft, Nontender, Nondistended; Bowel sounds present  EXTREMITIES:  2+ Peripheral Pulses, No clubbing, cyanosis, or edema    Plan:    #DKA with HAGMA  - resolved; AG 11, FS wnl, off insulin drip  - on Lantus 17 U and Humalog 6 U  - tolerating PO intake, initiated on regular diet  - f/u BMP  - f/u with Endocrine after discharge, insulin regimen as recommended    #Leukocytosis  - resolved, no clinical signs of infection  - f/u blood and urine cx Patient is a 34y old female with a PMHx DM Type I, noncompliant, who presented 1/13 with dizziness, headache, and generalized weakness for 2 days. Pt also reports substernal chest pain that worsens when speaking. She states she has not taken insulin since 1/11 due to difficulties renewing her subscription after she moved from [place] 1 month ago. She states she sees an endocrinologist, Dr. Salgado, on [place]. She states that since her diagnosis of DM Type I in 2014, she has been admitted to the ICU 12 times with DKA.  In the ED, she was found to have HAGMA (37), ketones >9.0 and diagnosed with DKA. K was 7.2 and EKG showed hyperacute T waves. She received two boluses of 4 L LR, as well as calcium gluconate, improving her potassium levels and EKG findings. She was upgraded to CCU, where she received a continuous insulin drip and D5 1/2 NS. Her anion gap closed and fingersticks normalized and she was initiated on Lantus 17 U. Her insulin drip and fluids were d/c'd and she is resuming regular diet, tolerating PO intake well. Her symptoms have improved as well and she can be downgraded.  Her stay was complicated by ALHAJI, her Cr was 1.7 on admission and came down to 0.9. She was also found to have leukocytosis on admission, but no clinical signs of infection. UA was negative and blood cultures are NGTD, leading to the likely cause being longstanding noncompliance to her DM regimen.     INTERVAL HPI/OVERNIGHT EVENTS:  ICU Vital Signs Last 24 Hrs  T(C): 36.1 (15 Ari 2020 08:00), Max: 37.1 (14 Jan 2020 12:00)  T(F): 97 (15 Ari 2020 08:00), Max: 98.7 (14 Jan 2020 12:00)  HR: 90 (15 Ari 2020 08:00) (68 - 98)  BP: 100/61 (15 Ari 2020 06:00) (81/48 - 109/58)  BP(mean): 75 (15 Ari 2020 06:00) (53 - 79)  ABP: --  ABP(mean): --  RR: 12 (15 Ari 2020 06:00) (12 - 19)  SpO2: 96% (15 Ari 2020 06:00) (96% - 98%)    I&O's Summary    14 Jan 2020 07:01  -  15 Ari 2020 07:00  --------------------------------------------------------  IN: 5541 mL / OUT: 1700 mL / NET: 3841 mL    LABS:                        10.5   7.03  )-----------( 285      ( 15 Ari 2020 04:47 )             32.7     01-15    140  |  113<H>  |  5<L>  ----------------------------<  137<H>  4.5   |  16<L>  |  0.9    Ca    8.5      15 Ari 2020 04:47  Phos  2.6     01-14  Mg     1.8     01-15    TPro  6.9  /  Alb  3.7  /  TBili  <0.2  /  DBili  x   /  AST  55<H>  /  ALT  25  /  AlkPhos  93  01-14    PT/INR - ( 13 Jan 2020 17:58 )   PT: 13.40 sec;   INR: 1.17 ratio        PHYSICAL EXAM:  GENERAL: well built, well nourished, resting comfortably in bed on RA  CHEST/LUNG: B/L good air entry; No rales, rhonchi, or wheezing  HEART: S1S2 normal, no S3, Regular rate and rhythm; No murmurs, rubs, or gallops  ABDOMEN: Soft, Nontender, Nondistended; Bowel sounds present  EXTREMITIES:  2+ Peripheral Pulses, No clubbing, cyanosis, or edema    Plan:    #DKA with HAGMA  - resolved; AG 11, FS wnl, off insulin drip  - on Lantus 17 U and Humalog 6 U  - tolerating PO intake, initiated on regular diet  - f/u BMP  - f/u with Endocrine after discharge, insulin regimen as recommended    #Leukocytosis  - resolved, no clinical signs of infection  - f/u blood and urine cx    DVT ppx: HSQ 5000 U SQ q 8h  GI ppx: Protonix 40 mg IV q daily  Diet: DASH  Activity: Inc as tolerated  Dispo: downgrade to regular floor   Code status: Full code Patient is a 34y old female with a PMHx DM Type I, noncompliant, who presented 1/13 with dizziness, headache, and generalized weakness for 2 days. Pt also reports substernal chest pain that worsens when speaking. She states she has not taken insulin since 1/11 due to difficulties renewing her subscription after she moved from [place] 1 month ago. She states she sees an endocrinologist, Dr. Salgado, on [place]. She states that since her diagnosis of DM Type I in 2014, she has been admitted to the ICU 12 times with DKA.  In the ED, she was found to have HAGMA (AG 37), ketones >9.0 and diagnosed with DKA. K was 7.2 and EKG showed hyperacute T waves. She received two boluses of 2 L LR, as well as calcium gluconate, improving her potassium levels and EKG findings. She was upgraded to CCU, where she received a continuous insulin drip and D5 1/2 NS. Her anion gap closed and fingersticks normalized and she was initiated on Lantus 17 U. Her insulin drip and fluids were d/c'd and she is resuming regular diet, tolerating PO intake well. Her symptoms have improved as well and she can be downgraded.  Her stay was complicated by ALHAJI, her Cr was 1.7 on admission and came down as she improved, currently 0.9. She was also found to have leukocytosis (WBC 25) on admission, but continues to have no clinical signs of infection. UA was negative and blood cultures are NGTD, leading to the likely cause being longstanding noncompliance to her DM regimen.     INTERVAL HPI/OVERNIGHT EVENTS:  ICU Vital Signs Last 24 Hrs  T(C): 36.1 (15 Ari 2020 08:00), Max: 37.1 (14 Jan 2020 12:00)  T(F): 97 (15 Ari 2020 08:00), Max: 98.7 (14 Jan 2020 12:00)  HR: 90 (15 Ari 2020 08:00) (68 - 98)  BP: 100/61 (15 Ari 2020 06:00) (81/48 - 109/58)  BP(mean): 75 (15 Ari 2020 06:00) (53 - 79)  ABP: --  ABP(mean): --  RR: 12 (15 Ari 2020 06:00) (12 - 19)  SpO2: 96% (15 Ari 2020 06:00) (96% - 98%)    I&O's Summary    14 Jan 2020 07:01  -  15 Ari 2020 07:00  --------------------------------------------------------  IN: 5541 mL / OUT: 1700 mL / NET: 3841 mL    LABS:                        10.5   7.03  )-----------( 285      ( 15 Ari 2020 04:47 )             32.7     01-15    140  |  113<H>  |  5<L>  ----------------------------<  137<H>  4.5   |  16<L>  |  0.9    Ca    8.5      15 Ari 2020 04:47  Phos  2.6     01-14  Mg     1.8     01-15    TPro  6.9  /  Alb  3.7  /  TBili  <0.2  /  DBili  x   /  AST  55<H>  /  ALT  25  /  AlkPhos  93  01-14    PT/INR - ( 13 Jan 2020 17:58 )   PT: 13.40 sec;   INR: 1.17 ratio        PHYSICAL EXAM:  GENERAL: well built, well nourished, resting comfortably in bed on RA  CHEST/LUNG: B/L good air entry; No rales, rhonchi, or wheezing  HEART: S1S2 normal, no S3, Regular rate and rhythm; No murmurs, rubs, or gallops  ABDOMEN: Soft, Nontender, Nondistended; Bowel sounds present  EXTREMITIES:  2+ Peripheral Pulses, No clubbing, cyanosis, or edema    Plan:    #DKA with HAGMA  - resolved; AG 11, FS wnl, off insulin drip  - on Lantus 17 U and Humalog 6 U  - tolerating PO intake, initiated on regular diet  - f/u BMP  - f/u with Endocrine after discharge, insulin regimen as recommended    #Leukocytosis  - resolved, no clinical signs of infection  - f/u blood and urine cx    DVT ppx: HSQ 5000 U SQ q 8h  GI ppx: Protonix 40 mg IV q daily  Diet: DASH  Activity: Inc as tolerated  Dispo: downgrade to regular floor   Code status: Full code Patient is a 34y old female with a PMHx DM Type I, noncompliant, who presented 1/13 with dizziness, headache, and generalized weakness for 2 days. Pt also reports substernal chest pain that worsens when speaking. She states she has not taken insulin since 1/11 due to difficulties renewing her prescription after she moved from [place] 1 month ago. She states she sees an endocrinologist, Dr. Salgado, on [place]. She states that since her diagnosis of DM Type I in 2014, she has been admitted to the ICU 12 times with DKA.  In the ED, she was found to have HAGMA (AG 37), ketones >9.0 and diagnosed with DKA. K was 7.2 and EKG showed hyperacute T waves. She received two boluses of 2 L LR, as well as calcium gluconate, improving her potassium levels and EKG findings. She was upgraded to CCU, where she received a continuous insulin drip and D5 1/2 NS. Her anion gap closed and fingersticks normalized and she was initiated on Lantus 17 U. Her insulin drip and fluids were d/c'd and she is resuming regular diet, tolerating PO intake well. Her symptoms have improved as well and she can be downgraded.  Her stay was complicated by ALHAJI, her Cr was 1.7 on admission and came down as she improved, currently 0.9. She was also found to have leukocytosis (WBC 25) on admission, but continues to have no clinical signs of infection. UA was negative and blood cultures are NGTD, leading to the likely cause being longstanding noncompliance to her DM regimen.     INTERVAL HPI/OVERNIGHT EVENTS:  ICU Vital Signs Last 24 Hrs  T(C): 36.1 (15 Ari 2020 08:00), Max: 37.1 (14 Jan 2020 12:00)  T(F): 97 (15 Ari 2020 08:00), Max: 98.7 (14 Jan 2020 12:00)  HR: 90 (15 Ari 2020 08:00) (68 - 98)  BP: 100/61 (15 Ari 2020 06:00) (81/48 - 109/58)  BP(mean): 75 (15 Ari 2020 06:00) (53 - 79)  ABP: --  ABP(mean): --  RR: 12 (15 Ari 2020 06:00) (12 - 19)  SpO2: 96% (15 Ari 2020 06:00) (96% - 98%)    I&O's Summary    14 Jan 2020 07:01  -  15 Ari 2020 07:00  --------------------------------------------------------  IN: 5541 mL / OUT: 1700 mL / NET: 3841 mL    LABS:                        10.5   7.03  )-----------( 285      ( 15 Ari 2020 04:47 )             32.7     01-15    140  |  113<H>  |  5<L>  ----------------------------<  137<H>  4.5   |  16<L>  |  0.9    Ca    8.5      15 Ari 2020 04:47  Phos  2.6     01-14  Mg     1.8     01-15    TPro  6.9  /  Alb  3.7  /  TBili  <0.2  /  DBili  x   /  AST  55<H>  /  ALT  25  /  AlkPhos  93  01-14    PT/INR - ( 13 Jan 2020 17:58 )   PT: 13.40 sec;   INR: 1.17 ratio        PHYSICAL EXAM:  GENERAL: well built, well nourished, resting comfortably in bed on RA  CHEST/LUNG: B/L good air entry; No rales, rhonchi, or wheezing  HEART: S1S2 normal, no S3, Regular rate and rhythm; No murmurs, rubs, or gallops  ABDOMEN: Soft, Nontender, Nondistended; Bowel sounds present  EXTREMITIES:  2+ Peripheral Pulses, No clubbing, cyanosis, or edema    Plan:    #DKA with HAGMA  - resolved; AG 11, FS wnl, off insulin drip  - on Lantus 17 U and Humalog 6 U  - tolerating PO intake, initiated on regular diet  - f/u BMP  - f/u with Endocrine after discharge, insulin regimen as recommended    #Leukocytosis  - resolved, no clinical signs of infection  - f/u blood and urine cx    DVT ppx: HSQ 5000 U SQ q 8h  GI ppx: Protonix 40 mg IV q daily  Diet: DASH  Activity: Inc as tolerated  Dispo: downgrade to regular floor   Code status: Full code

## 2020-01-15 NOTE — PROGRESS NOTE ADULT - SUBJECTIVE AND OBJECTIVE BOX
Reason for Endocrinology Consult: Diabetes    HPI: 34y Female    Diabetes Type:  Duration of Diabetes:  Diabetes Complications:  History of DKA?  Eye doctor within past year?  Current Therapy:      Home FSG:  Fasting:  Lunch:  Dinner:  Bedtime:    Hypoglycemia?    Neuroglycopenia within past year?    Outpatient Endocrinologist?    PAST MEDICAL & SURGICAL HISTORY:  Diabetes    FAMILY HISTORY:      SH:  Smoking  Etoh:  Recreational Drugs:    Home Medications:  HumaLOG 100 units/mL subcutaneous solution: 10 unit(s) subcutaneous 3 times a day (13 Jan 2020 19:38)  insulin glargine 100 units/mL subcutaneous solution: 25 unit(s) subcutaneous once a day (at bedtime) (13 Jan 2020 19:39)  metFORMIN 500 mg oral tablet: 1 tab(s) orally 2 times a day (13 Jan 2020 19:39)      Current (Non-Endocrine) Meds:  ALBUTerol    0.083% 2.5 milliGRAM(s) Nebulizer every 6 hours  chlorhexidine 4% Liquid 1 Application(s) Topical <User Schedule>  dextrose 5% + sodium chloride 0.45% 1000 milliLiter(s) IV Continuous <Continuous>  heparin  Injectable 5000 Unit(s) SubCutaneous every 8 hours  influenza   Vaccine 0.5 milliLiter(s) IntraMuscular once  pantoprazole  Injectable 40 milliGRAM(s) IV Push daily      Current Endocrine Meds:   dextrose 40% Gel 15 Gram(s) Oral once PRN  dextrose 50% Injectable 12.5 Gram(s) IV Push once  dextrose 50% Injectable 25 Gram(s) IV Push once  dextrose 50% Injectable 25 Gram(s) IV Push once  glucagon  Injectable 1 milliGRAM(s) IntraMuscular once PRN  insulin regular Infusion 2 Unit(s)/Hr IV Continuous <Continuous>      Allergies:  No Known Allergies      ROS:  Denies the following except as indicated.    General: weight loss/weight gain, decreased appetite, fatigue, fever  Eyes: blurry vision, double vision  ENT: neck swelling, dysphagia, voice changes   CV: palpitations, SOB, chest pain, cough  GI: nausea, vomiting, diarrhea, constipation, abdominal pain  : nocturia,  polyuria, dysuria  Endo: decreased libido, heat/cold intolerance, jitteriness  MSK: arthralgias, myalgias  Skin: rash, dryness, diaphoresis  Neuro: pedal numbness,pedal paresthesias, pedal pain    Height (cm): 160 (01-13 @ 21:30)  Weight (kg): 58.4 (01-13 @ 21:30)  BMI (kg/m2): 22.8 (01-13 @ 21:30)    Vital Signs Last 24 Hrs  T(C): 36.7 (15 Ari 2020 16:00), Max: 36.7 (15 Ari 2020 16:00)  T(F): 98 (15 Ari 2020 16:00), Max: 98 (15 Ari 2020 16:00)  HR: 98 (15 Ari 2020 18:00) (68 - 98)  BP: 107/66 (15 Ari 2020 18:00) (81/48 - 109/69)  BP(mean): 88 (15 Ari 2020 18:00) (61 - 88)  RR: 26 (15 Ari 2020 18:00) (12 - 26)  SpO2: 99% (15 Ari 2020 18:00) (96% - 99%)  Constitutional: WN/WD in NAD.   Neck: no thyromegaly or palpable thyroid nodules   Respiratory: lungs CTAB.  Cardiovascular: regular rate and rhythm, normal S1 and S2, no audible murmurs  GI: soft, NT/ND, no masses/HSM appreciated.  Ext: no edema, no ulcers, pedal pulses palpable bilaterally  Neurology: no tremor, monofilament sensation intact in feet  Psychiatric: A&O x 3, normal affect/mood.        LABS:                        10.5   7.03  )-----------( 285      ( 15 Ari 2020 04:47 )             32.7     01-15    139  |  108  |  4<L>  ----------------------------<  206<H>  4.4   |  15<L>  |  0.8    Ca    9.0      15 Ari 2020 11:57  Phos  2.6     01-14  Mg     1.8     01-15    TPro  6.9  /  Alb  3.7  /  TBili  <0.2  /  DBili  x   /  AST  55<H>  /  ALT  25  /  AlkPhos  93  01-14                                       RADIOLOGY & ADDITIONAL STUDIES:    A/P:34yFemale    1.  DM  For now:  Glargine-    units at bedtime  Lispro-    units three times a day before meals   Will continue to monitor     At discharge, recommend:      Pt can follow up at discharge with:    Above discussed with resident.

## 2020-01-16 LAB
ANION GAP SERPL CALC-SCNC: 13 MMOL/L — SIGNIFICANT CHANGE UP (ref 7–14)
BASOPHILS # BLD AUTO: 0.01 K/UL — SIGNIFICANT CHANGE UP (ref 0–0.2)
BASOPHILS NFR BLD AUTO: 0.2 % — SIGNIFICANT CHANGE UP (ref 0–1)
BUN SERPL-MCNC: 5 MG/DL — LOW (ref 10–20)
CALCIUM SERPL-MCNC: 8.4 MG/DL — LOW (ref 8.5–10.1)
CHLORIDE SERPL-SCNC: 110 MMOL/L — SIGNIFICANT CHANGE UP (ref 98–110)
CO2 SERPL-SCNC: 19 MMOL/L — SIGNIFICANT CHANGE UP (ref 17–32)
CREAT SERPL-MCNC: 0.9 MG/DL — SIGNIFICANT CHANGE UP (ref 0.7–1.5)
EOSINOPHIL # BLD AUTO: 0.05 K/UL — SIGNIFICANT CHANGE UP (ref 0–0.7)
EOSINOPHIL NFR BLD AUTO: 1.1 % — SIGNIFICANT CHANGE UP (ref 0–8)
ESTIMATED AVERAGE GLUCOSE: 266 MG/DL — HIGH (ref 68–114)
GLUCOSE BLDC GLUCOMTR-MCNC: 125 MG/DL — HIGH (ref 70–99)
GLUCOSE BLDC GLUCOMTR-MCNC: 128 MG/DL — HIGH (ref 70–99)
GLUCOSE BLDC GLUCOMTR-MCNC: 139 MG/DL — HIGH (ref 70–99)
GLUCOSE BLDC GLUCOMTR-MCNC: 260 MG/DL — HIGH (ref 70–99)
GLUCOSE BLDC GLUCOMTR-MCNC: 262 MG/DL — HIGH (ref 70–99)
GLUCOSE BLDC GLUCOMTR-MCNC: 275 MG/DL — HIGH (ref 70–99)
GLUCOSE BLDC GLUCOMTR-MCNC: 55 MG/DL — LOW (ref 70–99)
GLUCOSE BLDC GLUCOMTR-MCNC: 59 MG/DL — LOW (ref 70–99)
GLUCOSE BLDC GLUCOMTR-MCNC: 64 MG/DL — LOW (ref 70–99)
GLUCOSE BLDC GLUCOMTR-MCNC: 88 MG/DL — SIGNIFICANT CHANGE UP (ref 70–99)
GLUCOSE BLDC GLUCOMTR-MCNC: 93 MG/DL — SIGNIFICANT CHANGE UP (ref 70–99)
GLUCOSE BLDC GLUCOMTR-MCNC: 95 MG/DL — SIGNIFICANT CHANGE UP (ref 70–99)
GLUCOSE SERPL-MCNC: 111 MG/DL — HIGH (ref 70–99)
HBA1C BLD-MCNC: 10.9 % — HIGH (ref 4–5.6)
HCT VFR BLD CALC: 32.1 % — LOW (ref 37–47)
HGB BLD-MCNC: 10.5 G/DL — LOW (ref 12–16)
IMM GRANULOCYTES NFR BLD AUTO: 0 % — LOW (ref 0.1–0.3)
LYMPHOCYTES # BLD AUTO: 1.22 K/UL — SIGNIFICANT CHANGE UP (ref 1.2–3.4)
LYMPHOCYTES # BLD AUTO: 26.2 % — SIGNIFICANT CHANGE UP (ref 20.5–51.1)
MCHC RBC-ENTMCNC: 27.9 PG — SIGNIFICANT CHANGE UP (ref 27–31)
MCHC RBC-ENTMCNC: 32.7 G/DL — SIGNIFICANT CHANGE UP (ref 32–37)
MCV RBC AUTO: 85.1 FL — SIGNIFICANT CHANGE UP (ref 81–99)
MONOCYTES # BLD AUTO: 0.37 K/UL — SIGNIFICANT CHANGE UP (ref 0.1–0.6)
MONOCYTES NFR BLD AUTO: 8 % — SIGNIFICANT CHANGE UP (ref 1.7–9.3)
MRSA PCR RESULT.: NEGATIVE — SIGNIFICANT CHANGE UP
NEUTROPHILS # BLD AUTO: 3 K/UL — SIGNIFICANT CHANGE UP (ref 1.4–6.5)
NEUTROPHILS NFR BLD AUTO: 64.5 % — SIGNIFICANT CHANGE UP (ref 42.2–75.2)
NRBC # BLD: 0 /100 WBCS — SIGNIFICANT CHANGE UP (ref 0–0)
PLATELET # BLD AUTO: 267 K/UL — SIGNIFICANT CHANGE UP (ref 130–400)
POTASSIUM SERPL-MCNC: 4.1 MMOL/L — SIGNIFICANT CHANGE UP (ref 3.5–5)
POTASSIUM SERPL-SCNC: 4.1 MMOL/L — SIGNIFICANT CHANGE UP (ref 3.5–5)
RBC # BLD: 3.77 M/UL — LOW (ref 4.2–5.4)
RBC # FLD: 14.6 % — HIGH (ref 11.5–14.5)
SODIUM SERPL-SCNC: 142 MMOL/L — SIGNIFICANT CHANGE UP (ref 135–146)
WBC # BLD: 4.65 K/UL — LOW (ref 4.8–10.8)
WBC # FLD AUTO: 4.65 K/UL — LOW (ref 4.8–10.8)

## 2020-01-16 PROCEDURE — 99233 SBSQ HOSP IP/OBS HIGH 50: CPT

## 2020-01-16 RX ORDER — INSULIN GLARGINE 100 [IU]/ML
20 INJECTION, SOLUTION SUBCUTANEOUS AT BEDTIME
Refills: 0 | Status: DISCONTINUED | OUTPATIENT
Start: 2020-01-16 | End: 2020-01-17

## 2020-01-16 RX ORDER — INSULIN GLARGINE 100 [IU]/ML
12.5 INJECTION, SOLUTION SUBCUTANEOUS ONCE
Refills: 0 | Status: DISCONTINUED | OUTPATIENT
Start: 2020-01-16 | End: 2020-01-16

## 2020-01-16 RX ORDER — INSULIN HUMAN 100 [IU]/ML
1 INJECTION, SOLUTION SUBCUTANEOUS
Qty: 100 | Refills: 0 | Status: DISCONTINUED | OUTPATIENT
Start: 2020-01-16 | End: 2020-01-16

## 2020-01-16 RX ORDER — INSULIN LISPRO 100/ML
VIAL (ML) SUBCUTANEOUS
Refills: 0 | Status: DISCONTINUED | OUTPATIENT
Start: 2020-01-16 | End: 2020-01-17

## 2020-01-16 RX ORDER — INSULIN GLARGINE 100 [IU]/ML
24 INJECTION, SOLUTION SUBCUTANEOUS AT BEDTIME
Refills: 0 | Status: DISCONTINUED | OUTPATIENT
Start: 2020-01-16 | End: 2020-01-16

## 2020-01-16 RX ORDER — INSULIN LISPRO 100/ML
5 VIAL (ML) SUBCUTANEOUS
Refills: 0 | Status: DISCONTINUED | OUTPATIENT
Start: 2020-01-16 | End: 2020-01-17

## 2020-01-16 RX ORDER — INSULIN LISPRO 100/ML
8 VIAL (ML) SUBCUTANEOUS
Refills: 0 | Status: DISCONTINUED | OUTPATIENT
Start: 2020-01-16 | End: 2020-01-16

## 2020-01-16 RX ORDER — PANTOPRAZOLE SODIUM 20 MG/1
40 TABLET, DELAYED RELEASE ORAL
Refills: 0 | Status: DISCONTINUED | OUTPATIENT
Start: 2020-01-16 | End: 2020-01-17

## 2020-01-16 RX ADMIN — PANTOPRAZOLE SODIUM 40 MILLIGRAM(S): 20 TABLET, DELAYED RELEASE ORAL at 11:36

## 2020-01-16 RX ADMIN — Medication 8 UNIT(S): at 11:37

## 2020-01-16 RX ADMIN — CHLORHEXIDINE GLUCONATE 1 APPLICATION(S): 213 SOLUTION TOPICAL at 05:06

## 2020-01-16 RX ADMIN — HEPARIN SODIUM 5000 UNIT(S): 5000 INJECTION INTRAVENOUS; SUBCUTANEOUS at 05:06

## 2020-01-16 RX ADMIN — INSULIN GLARGINE 20 UNIT(S): 100 INJECTION, SOLUTION SUBCUTANEOUS at 21:35

## 2020-01-16 NOTE — PROGRESS NOTE ADULT - SUBJECTIVE AND OBJECTIVE BOX
RAJENDRA PRINCE MRN-[id number]    Hospitalist Note  33yo F with Past Medical History DMI admitted with dyspnea accompanied by abdominal pain, nausea, and vomiting secondary to acute DKA.  The patients admits to non-compliance with her insulin prior to admission.  She was severely acidotic (pH 6.9) with AG 37 on admission.  Her AG and acidosis improved following treatment with aggressive IV hydration and insulin gtt.    Overnight events/Updates: The patient has been tolerating a PO diet.  Endocrinology recommended transitioning to basal bolus insulin from an insulin gtt.  The patient was also offered an insulin pump in the near term future, however, she wishes to continue with injectable insulin.    Vital Signs Last 24 Hrs  T(C): 37.1 (16 Jan 2020 12:00), Max: 37.1 (16 Jan 2020 12:00)  T(F): 98.7 (16 Jan 2020 12:00), Max: 98.7 (16 Jan 2020 12:00)  HR: 84 (16 Jan 2020 12:00) (76 - 100)  BP: 112/66 (16 Jan 2020 12:00) (94/59 - 117/62)  BP(mean): 88 (16 Jan 2020 12:00) (73 - 100)  RR: 20 (16 Jan 2020 12:00) (14 - 26)  SpO2: 99% (16 Jan 2020 12:00) (96% - 99%)    Physical Examination:  General: AAO x 3  HEENT: PERRLA, EOMI  CV= S1 & S2 appreciated  Lungs= CTA BL  Abdominal Examination= + BS, Soft, NT/ND  Extremity Examination= No C/C/E    ROS: No chest pain, no shortness of breath.  All other systems reviewed and are within normal limits except for the complaints in the HPI.    MEDICATIONS  (STANDING):  ALBUTerol    0.083% 2.5 milliGRAM(s) Nebulizer every 6 hours  chlorhexidine 4% Liquid 1 Application(s) Topical <User Schedule>  dextrose 50% Injectable 50 milliLiter(s) IV Push once  dextrose 50% Injectable 12.5 Gram(s) IV Push once  dextrose 50% Injectable 25 Gram(s) IV Push once  dextrose 50% Injectable 25 Gram(s) IV Push once  heparin  Injectable 5000 Unit(s) SubCutaneous every 8 hours  influenza   Vaccine 0.5 milliLiter(s) IntraMuscular once  insulin glargine Injectable (LANTUS) 24 Unit(s) SubCutaneous at bedtime  insulin lispro Injectable (HumaLOG) 8 Unit(s) SubCutaneous three times a day before meals  pantoprazole  Injectable 40 milliGRAM(s) IV Push daily    MEDICATIONS  (PRN):  dextrose 40% Gel 15 Gram(s) Oral once PRN Blood Glucose LESS THAN 70 milliGRAM(s)/deciliter  glucagon  Injectable 1 milliGRAM(s) IntraMuscular once PRN Glucose LESS THAN 70 milligrams/deciliter                            10.5   4.65  )-----------( 267      ( 16 Jan 2020 04:23 )             32.1     01-16    142  |  110  |  5<L>  ----------------------------<  111<H>  4.1   |  19  |  0.9    Ca    8.4<L>      16 Jan 2020 04:23  Mg     1.8     01-15        Case discussed with housestaff & family  JER Longo [id number]

## 2020-01-16 NOTE — CHART NOTE - NSCHARTNOTEFT_GEN_A_CORE
Patient is a 34y old female with a PMHx DM Type I, noncompliant, who presented 1/13 with dizziness, headache, and generalized weakness for 2 days. Pt also reports substernal chest pain that worsens when speaking. She states she has not taken insulin since 1/11 due to difficulties renewing her prescription after she moved from [place] 1 month ago. She states she sees an endocrinologist, Dr. Salgado, on [place]. She states that since her diagnosis of DM Type I in 2014, she has been admitted to the ICU 12 times with DKA.  In the ED, she was found to have HAGMA (AG 37), ketones >9.0 and diagnosed with DKA. K was 7.2 and EKG showed hyperacute T waves. She received two boluses of 2 L LR, as well as calcium gluconate, improving her potassium levels and EKG findings. She was upgraded to CCU, where she received a continuous insulin drip and D5 1/2 NS. Her anion gap closed and fingersticks normalized and she was initiated on Lantus 17 U on 1/15. Her insulin drip and fluids were d/c'd and she resumed regular diet, tolerating PO intake well. Her symptoms had improved and she was prepared to be downgraded. Her noon labs then revealed an increase in AG (16) and she was resumed on insulin drip, which was d/c'd on 1/16 and Lantus 24 U SQ was initiated. She reports she feels well and has no complaints.  Her stay was complicated by ALHAJI, her Cr was 1.7 on admission and came down as she improved, currently 0.9. She was also found to have leukocytosis (WBC 25) on admission, but continues to have no clinical signs of infection. UA was negative and blood cultures are NGTD, leading to the likely cause being longstanding noncompliance to her DM regimen.       INTERVAL HPI/OVERNIGHT EVENTS:  ICU Vital Signs Last 24 Hrs  T(C): 36.7 (16 Jan 2020 08:00), Max: 36.8 (16 Jan 2020 00:00)  T(F): 98.1 (16 Jan 2020 08:00), Max: 98.2 (16 Jan 2020 00:00)  HR: 78 (16 Jan 2020 10:00) (68 - 100)  BP: 115/87 (16 Jan 2020 10:00) (94/59 - 117/62)  BP(mean): 100 (16 Jan 2020 10:00) (73 - 100)  ABP: --  ABP(mean): --  RR: 17 (16 Jan 2020 10:00) (14 - 26)  SpO2: 98% (16 Jan 2020 08:00) (96% - 99%)    I&O's Summary    15 Ari 2020 07:01  -  16 Jan 2020 07:00  --------------------------------------------------------  IN: 3516 mL / OUT: 1250 mL / NET: 2266 mL    16 Jan 2020 07:01  -  16 Jan 2020 11:04  --------------------------------------------------------  IN: 780 mL / OUT: 300 mL / NET: 480 mL          LABS:                        10.5   4.65  )-----------( 267      ( 16 Jan 2020 04:23 )             32.1     01-16    142  |  110  |  5<L>  ----------------------------<  111<H>  4.1   |  19  |  0.9    Ca    8.4<L>      16 Jan 2020 04:23  Phos  2.6     01-14  Mg     1.8     01-15          CAPILLARY BLOOD GLUCOSE      POCT Blood Glucose.: 88 mg/dL (16 Jan 2020 07:40)  POCT Blood Glucose.: 64 mg/dL (16 Jan 2020 06:47)  POCT Blood Glucose.: 93 mg/dL (16 Jan 2020 04:53)  POCT Blood Glucose.: 95 mg/dL (16 Jan 2020 03:30)  POCT Blood Glucose.: 128 mg/dL (16 Jan 2020 02:06)  POCT Blood Glucose.: 125 mg/dL (16 Jan 2020 00:47)  POCT Blood Glucose.: 63 mg/dL (15 Ari 2020 23:45)  POCT Blood Glucose.: 125 mg/dL (15 Ari 2020 21:59)  POCT Blood Glucose.: 118 mg/dL (15 Ari 2020 21:11)  POCT Blood Glucose.: 115 mg/dL (15 Ari 2020 20:05)  POCT Blood Glucose.: 133 mg/dL (15 Ari 2020 18:09)  POCT Blood Glucose.: 109 mg/dL (15 Ari 2020 17:11)  POCT Blood Glucose.: 234 mg/dL (15 Ari 2020 15:43)  POCT Blood Glucose.: 197 mg/dL (15 Ari 2020 12:31)        RADIOLOGY & ADDITIONAL TESTS:    Consultant(s) Notes Reviewed:  [x ] YES  [ ] NO    MEDICATIONS  (STANDING):  ALBUTerol    0.083% 2.5 milliGRAM(s) Nebulizer every 6 hours  chlorhexidine 4% Liquid 1 Application(s) Topical <User Schedule>  dextrose 50% Injectable 50 milliLiter(s) IV Push once  dextrose 50% Injectable 12.5 Gram(s) IV Push once  dextrose 50% Injectable 25 Gram(s) IV Push once  dextrose 50% Injectable 25 Gram(s) IV Push once  heparin  Injectable 5000 Unit(s) SubCutaneous every 8 hours  influenza   Vaccine 0.5 milliLiter(s) IntraMuscular once  insulin glargine Injectable (LANTUS) 24 Unit(s) SubCutaneous at bedtime  insulin lispro Injectable (HumaLOG) 8 Unit(s) SubCutaneous three times a day before meals  pantoprazole  Injectable 40 milliGRAM(s) IV Push daily    MEDICATIONS  (PRN):  dextrose 40% Gel 15 Gram(s) Oral once PRN Blood Glucose LESS THAN 70 milliGRAM(s)/deciliter  glucagon  Injectable 1 milliGRAM(s) IntraMuscular once PRN Glucose LESS THAN 70 milligrams/deciliter      PHYSICAL EXAM:  GENERAL: well built, well nourished, resting comfortably in bed on RA  CHEST/LUNG: B/L good air entry; No rales, rhonchi, or wheezing  HEART: S1S2 normal, no S3, Regular rate and rhythm; No murmurs, rubs, or gallops  ABDOMEN: Soft, Nontender, Nondistended; Bowel sounds present  EXTREMITIES:  2+ Peripheral Pulses, No clubbing, cyanosis, or edema    Plan:    #DKA with HAGMA  - resolved; AG 13, off insulin drip  - on Lantus 24 U and Humalog 8 U  - tolerating regular diet  - f/u BMP  - f/u with Endocrine as an outpt  - Case management consulted for home diabetes services    #Leukocytosis  - resolved, no clinical signs of infection  - f/u blood and urine cx    DVT ppx: HSQ 5000 U SQ q 8h  GI ppx: Protonix 40 mg IV q daily  Diet: DASH  Activity: Inc as tolerated  Dispo: downgrade to regular floor   Code status: Full code. Patient is a 34y old female with a PMHx DM Type I, noncompliant, who presented 1/13 with dizziness, headache, and generalized weakness for 2 days. Pt also reports substernal chest pain that worsens when speaking. She states she has not taken insulin since 1/11 due to difficulties renewing her prescription after she moved from [place] 1 month ago. She states she sees an endocrinologist, Dr. Salgado, on [place]. She states that since her diagnosis of DM Type I in 2014, she has been admitted to the ICU 12 times with DKA.  In the ED, she was found to have HAGMA (AG 37), ketones >9.0 and diagnosed with DKA. K was 7.2 and EKG showed hyperacute T waves. She received two boluses of 2 L LR, as well as calcium gluconate, improving her potassium levels and EKG findings. She was upgraded to CCU, where she received a continuous insulin drip and D5 1/2 NS. Her anion gap closed and fingersticks normalized and she was initiated on Lantus 17 U on 1/15. Her insulin drip and fluids were d/c'd and she resumed regular diet, tolerating PO intake well. Her symptoms had improved and she was prepared to be downgraded. Her noon labs then revealed an increase in AG (16) and she was resumed on insulin drip, which was d/c'd on 1/16 and Lantus 24 U SQ was initiated. She reports she feels well and has no complaints.  Her stay was complicated by ALHAJI, her Cr was 1.7 on admission and came down as she improved, currently 0.9. She was also found to have leukocytosis (WBC 25) on admission, but continues to have no clinical signs of infection. UA was negative and blood cultures are NGTD, leading to the likely cause being longstanding noncompliance to her DM regimen.       INTERVAL HPI/OVERNIGHT EVENTS:  ICU Vital Signs Last 24 Hrs  T(C): 36.7 (16 Jan 2020 08:00), Max: 36.8 (16 Jan 2020 00:00)  T(F): 98.1 (16 Jan 2020 08:00), Max: 98.2 (16 Jan 2020 00:00)  HR: 78 (16 Jan 2020 10:00) (68 - 100)  BP: 115/87 (16 Jan 2020 10:00) (94/59 - 117/62)  BP(mean): 100 (16 Jan 2020 10:00) (73 - 100)  ABP: --  ABP(mean): --  RR: 17 (16 Jan 2020 10:00) (14 - 26)  SpO2: 98% (16 Jan 2020 08:00) (96% - 99%)    I&O's Summary    15 Ari 2020 07:01  -  16 Jan 2020 07:00  --------------------------------------------------------  IN: 3516 mL / OUT: 1250 mL / NET: 2266 mL    16 Jan 2020 07:01  -  16 Jan 2020 11:04  --------------------------------------------------------  IN: 780 mL / OUT: 300 mL / NET: 480 mL          LABS:                        10.5   4.65  )-----------( 267      ( 16 Jan 2020 04:23 )             32.1     01-16    142  |  110  |  5<L>  ----------------------------<  111<H>  4.1   |  19  |  0.9    Ca    8.4<L>      16 Jan 2020 04:23  Phos  2.6     01-14  Mg     1.8     01-15          CAPILLARY BLOOD GLUCOSE      POCT Blood Glucose.: 88 mg/dL (16 Jan 2020 07:40)  POCT Blood Glucose.: 64 mg/dL (16 Jan 2020 06:47)  POCT Blood Glucose.: 93 mg/dL (16 Jan 2020 04:53)  POCT Blood Glucose.: 95 mg/dL (16 Jan 2020 03:30)  POCT Blood Glucose.: 128 mg/dL (16 Jan 2020 02:06)  POCT Blood Glucose.: 125 mg/dL (16 Jan 2020 00:47)  POCT Blood Glucose.: 63 mg/dL (15 Ari 2020 23:45)  POCT Blood Glucose.: 125 mg/dL (15 Ari 2020 21:59)  POCT Blood Glucose.: 118 mg/dL (15 Ari 2020 21:11)  POCT Blood Glucose.: 115 mg/dL (15 Ari 2020 20:05)  POCT Blood Glucose.: 133 mg/dL (15 Ari 2020 18:09)  POCT Blood Glucose.: 109 mg/dL (15 Ari 2020 17:11)  POCT Blood Glucose.: 234 mg/dL (15 Ari 2020 15:43)  POCT Blood Glucose.: 197 mg/dL (15 Ari 2020 12:31)        RADIOLOGY & ADDITIONAL TESTS:    Consultant(s) Notes Reviewed:  [x ] YES  [ ] NO    MEDICATIONS  (STANDING):  ALBUTerol    0.083% 2.5 milliGRAM(s) Nebulizer every 6 hours  chlorhexidine 4% Liquid 1 Application(s) Topical <User Schedule>  dextrose 50% Injectable 50 milliLiter(s) IV Push once  dextrose 50% Injectable 12.5 Gram(s) IV Push once  dextrose 50% Injectable 25 Gram(s) IV Push once  dextrose 50% Injectable 25 Gram(s) IV Push once  heparin  Injectable 5000 Unit(s) SubCutaneous every 8 hours  influenza   Vaccine 0.5 milliLiter(s) IntraMuscular once  insulin glargine Injectable (LANTUS) 24 Unit(s) SubCutaneous at bedtime  insulin lispro Injectable (HumaLOG) 8 Unit(s) SubCutaneous three times a day before meals  pantoprazole  Injectable 40 milliGRAM(s) IV Push daily    MEDICATIONS  (PRN):  dextrose 40% Gel 15 Gram(s) Oral once PRN Blood Glucose LESS THAN 70 milliGRAM(s)/deciliter  glucagon  Injectable 1 milliGRAM(s) IntraMuscular once PRN Glucose LESS THAN 70 milligrams/deciliter      PHYSICAL EXAM:  GENERAL: well built, well nourished, resting comfortably in bed on RA  CHEST/LUNG: B/L good air entry; No rales, rhonchi, or wheezing  HEART: S1S2 normal, no S3, Regular rate and rhythm; No murmurs, rubs, or gallops  ABDOMEN: Soft, Nontender, Nondistended; Bowel sounds present  EXTREMITIES:  2+ Peripheral Pulses, No clubbing, cyanosis, or edema    Plan:    #DKA with HAGMA  - resolved; AG 13, off insulin drip  - on Lantus 24 U and Humalog 8 U  - tolerating regular diet  - f/u BMP  - f/u with Endocrine as an outpt at diabetes clinic; recommend 16 U glargine AM and bedtime, 10 U lispro before each meal  - Case management consulted for home diabetes services    #Leukocytosis  - resolved, no clinical signs of infection  - f/u blood and urine cx    DVT ppx: HSQ 5000 U SQ q 8h  GI ppx: Protonix 40 mg IV q daily  Diet: DASH  Activity: Inc as tolerated  Dispo: downgrade to regular floor   Code status: Full code. Patient is a 34y old female with a PMHx DM Type I, noncompliant, who presented 1/13 with dizziness, headache, and generalized weakness for 2 days. Pt also reports substernal chest pain that worsens when speaking. She states she has not taken insulin since 1/11 due to difficulties renewing her prescription after she moved from [place] 1 month ago. She states she sees an endocrinologist, Dr. Salgado, on [place]. She states that since her diagnosis of DM Type I in 2014, she has been admitted to the ICU 12 times with DKA.  In the ED, she was found to have HAGMA (AG 37), ketones >9.0 and diagnosed with DKA. K was 7.2 and EKG showed hyperacute T waves. She received two boluses of 2 L LR, as well as calcium gluconate, improving her potassium levels and EKG findings. She was upgraded to CCU, where she received a continuous insulin drip and D5 1/2 NS. Her anion gap closed and fingersticks normalized and she was initiated on Lantus 17 U on 1/15. Her insulin drip and fluids were d/c'd and she resumed regular diet, tolerating PO intake well. Her symptoms had improved and she was prepared to be downgraded. Her noon labs then revealed an increase in AG (16) and she was resumed on insulin drip, which was d/c'd on 1/16 and Lantus 24 U SQ was initiated. She reports she feels well and has no complaints.  Her stay was complicated by ALHAJI, her Cr was 1.7 on admission and came down as she improved, currently 0.9. She was also found to have leukocytosis (WBC 25) on admission, but continues to have no clinical signs of infection. UA was negative and blood cultures are NGTD, leading to the likely cause being longstanding noncompliance to her DM regimen.       INTERVAL HPI/OVERNIGHT EVENTS:  ICU Vital Signs Last 24 Hrs  T(C): 36.7 (16 Jan 2020 08:00), Max: 36.8 (16 Jan 2020 00:00)  T(F): 98.1 (16 Jan 2020 08:00), Max: 98.2 (16 Jan 2020 00:00)  HR: 78 (16 Jan 2020 10:00) (68 - 100)  BP: 115/87 (16 Jan 2020 10:00) (94/59 - 117/62)  BP(mean): 100 (16 Jan 2020 10:00) (73 - 100)  ABP: --  ABP(mean): --  RR: 17 (16 Jan 2020 10:00) (14 - 26)  SpO2: 98% (16 Jan 2020 08:00) (96% - 99%)    I&O's Summary    15 Ari 2020 07:01  -  16 Jan 2020 07:00  --------------------------------------------------------  IN: 3516 mL / OUT: 1250 mL / NET: 2266 mL    16 Jan 2020 07:01  -  16 Jan 2020 11:04  --------------------------------------------------------  IN: 780 mL / OUT: 300 mL / NET: 480 mL          LABS:                        10.5   4.65  )-----------( 267      ( 16 Jan 2020 04:23 )             32.1     01-16    142  |  110  |  5<L>  ----------------------------<  111<H>  4.1   |  19  |  0.9    Ca    8.4<L>      16 Jan 2020 04:23  Phos  2.6     01-14  Mg     1.8     01-15          CAPILLARY BLOOD GLUCOSE      POCT Blood Glucose.: 88 mg/dL (16 Jan 2020 07:40)  POCT Blood Glucose.: 64 mg/dL (16 Jan 2020 06:47)  POCT Blood Glucose.: 93 mg/dL (16 Jan 2020 04:53)  POCT Blood Glucose.: 95 mg/dL (16 Jan 2020 03:30)  POCT Blood Glucose.: 128 mg/dL (16 Jan 2020 02:06)  POCT Blood Glucose.: 125 mg/dL (16 Jan 2020 00:47)  POCT Blood Glucose.: 63 mg/dL (15 Ari 2020 23:45)  POCT Blood Glucose.: 125 mg/dL (15 Ari 2020 21:59)  POCT Blood Glucose.: 118 mg/dL (15 Ari 2020 21:11)  POCT Blood Glucose.: 115 mg/dL (15 Ari 2020 20:05)  POCT Blood Glucose.: 133 mg/dL (15 Ari 2020 18:09)  POCT Blood Glucose.: 109 mg/dL (15 Ari 2020 17:11)  POCT Blood Glucose.: 234 mg/dL (15 Ari 2020 15:43)  POCT Blood Glucose.: 197 mg/dL (15 Ari 2020 12:31)        RADIOLOGY & ADDITIONAL TESTS:    Consultant(s) Notes Reviewed:  [x ] YES  [ ] NO    MEDICATIONS  (STANDING):  ALBUTerol    0.083% 2.5 milliGRAM(s) Nebulizer every 6 hours  chlorhexidine 4% Liquid 1 Application(s) Topical <User Schedule>  dextrose 50% Injectable 50 milliLiter(s) IV Push once  dextrose 50% Injectable 12.5 Gram(s) IV Push once  dextrose 50% Injectable 25 Gram(s) IV Push once  dextrose 50% Injectable 25 Gram(s) IV Push once  heparin  Injectable 5000 Unit(s) SubCutaneous every 8 hours  influenza   Vaccine 0.5 milliLiter(s) IntraMuscular once  insulin glargine Injectable (LANTUS) 24 Unit(s) SubCutaneous at bedtime  insulin lispro Injectable (HumaLOG) 8 Unit(s) SubCutaneous three times a day before meals  pantoprazole  Injectable 40 milliGRAM(s) IV Push daily    MEDICATIONS  (PRN):  dextrose 40% Gel 15 Gram(s) Oral once PRN Blood Glucose LESS THAN 70 milliGRAM(s)/deciliter  glucagon  Injectable 1 milliGRAM(s) IntraMuscular once PRN Glucose LESS THAN 70 milligrams/deciliter      PHYSICAL EXAM:  GENERAL: well built, well nourished, resting comfortably in bed on RA  CHEST/LUNG: B/L good air entry; No rales, rhonchi, or wheezing  HEART: S1S2 normal, no S3, Regular rate and rhythm; No murmurs, rubs, or gallops  ABDOMEN: Soft, Nontender, Nondistended; Bowel sounds present  EXTREMITIES:  2+ Peripheral Pulses, No clubbing, cyanosis, or edema    Plan:    #DKA with HAGMA  - resolved; AG 13, off insulin drip  - on Lantus 24 U and Humalog 8 U  - tolerating regular diet  - f/u BMP  - f/u with Endocrine as an outpt at diabetes clinic; recommend 16 U glargine AM and bedtime, 10 U lispro before each meal  - Case management consulted for home diabetes services  - schedule diabetes clinic appointment  for possible insulin pump if patient is intersted    #Leukocytosis  - resolved, no clinical signs of infection  - blood cx NGTD    DVT ppx: HSQ 5000 U SQ q 8h  GI ppx: Protonix 40 mg IV q daily  Diet: DASH  Activity: Inc as tolerated  Dispo: downgrade to regular floor   Code status: Full code.

## 2020-01-16 NOTE — PROGRESS NOTE ADULT - SUBJECTIVE AND OBJECTIVE BOX
OVERNIGHT EVENTS: events noted, no drips, eating    Vital Signs Last 24 Hrs  T(C): 36.6 (16 Jan 2020 04:00), Max: 36.8 (16 Jan 2020 00:00)  T(F): 97.8 (16 Jan 2020 04:00), Max: 98.2 (16 Jan 2020 00:00)  HR: 83 (16 Jan 2020 06:00) (68 - 98)  BP: 105/61 (16 Jan 2020 06:00) (94/59 - 113/68)  BP(mean): 76 (16 Jan 2020 06:00) (73 - 88)  RR: 18 (16 Jan 2020 06:00) (14 - 26)  SpO2: 97% (16 Jan 2020 04:00) (96% - 99%)    PHYSICAL EXAMINATION:    GENERAL: axoox3     HEENT: Head is normocephalic and atraumatic. Extraocular muscles are intact. Mucous membranes are moist.    NECK: Supple.    LUNGS: Clear to auscultation without wheezing, rales or rhonchi; respirations unlabored    HEART: Regular rate and rhythm without murmur.    ABDOMEN: Soft, nontender, and nondistended.      EXTREMITIES: Without any cyanosis, clubbing, rash, lesions or edema.    NEUROLOGIC: Grossly intact.    SKIN: No ulceration or induration present.      LABS:                        10.5   4.65  )-----------( 267      ( 16 Jan 2020 04:23 )             32.1     01-16    142  |  110  |  5<L>  ----------------------------<  111<H>  4.1   |  19  |  0.9    Ca    8.4<L>      16 Jan 2020 04:23  Phos  2.6     01-14  Mg     1.8     01-15                      Procalcitonin, Serum: 3.08 ng/mL (01-14-20 @ 04:45)        01-15-20 @ 07:01  -  01-16-20 @ 07:00  --------------------------------------------------------  IN: 3516 mL / OUT: 1250 mL / NET: 2266 mL        MICROBIOLOGY:  Culture Results:   No growth to date. (01-14 @ 03:36)  Culture Results:   No growth to date. (01-13 @ 19:10)      MEDICATIONS  (STANDING):  ALBUTerol    0.083% 2.5 milliGRAM(s) Nebulizer every 6 hours  chlorhexidine 4% Liquid 1 Application(s) Topical <User Schedule>  dextrose 5% + sodium chloride 0.45% 1000 milliLiter(s) (150 mL/Hr) IV Continuous <Continuous>  dextrose 50% Injectable 50 milliLiter(s) IV Push once  dextrose 50% Injectable 12.5 Gram(s) IV Push once  dextrose 50% Injectable 25 Gram(s) IV Push once  dextrose 50% Injectable 25 Gram(s) IV Push once  heparin  Injectable 5000 Unit(s) SubCutaneous every 8 hours  influenza   Vaccine 0.5 milliLiter(s) IntraMuscular once  insulin glargine Injectable (LANTUS) 24 Unit(s) SubCutaneous at bedtime  insulin lispro Injectable (HumaLOG) 8 Unit(s) SubCutaneous three times a day before meals  pantoprazole  Injectable 40 milliGRAM(s) IV Push daily    MEDICATIONS  (PRN):  dextrose 40% Gel 15 Gram(s) Oral once PRN Blood Glucose LESS THAN 70 milliGRAM(s)/deciliter  glucagon  Injectable 1 milliGRAM(s) IntraMuscular once PRN Glucose LESS THAN 70 milligrams/deciliter      RADIOLOGY & ADDITIONAL STUDIES:

## 2020-01-16 NOTE — PROGRESS NOTE ADULT - ASSESSMENT
33yo F with Past Medical History DMI admitted with dyspnea accompanied by abdominal pain, nausea, and vomiting secondary to acute DKA.      DKA with DMI: DKA has resolved from admission.  AG has decreased from 37 to 13.  Her DKA was triggered by non-compliance with her outpatient medications.  Continue Lantus 24u at bedtime and Lispro 8u with meals.  Continue CHO consistent diet and monitor FS with meals.  Repeat CBC and BMP in AM.  Follow-up with endocrinology prior to discharge.  Acute kidney injury/metabolic acidosis: renal function normalized from admission following treatment with aggressive hydration.  HCO3 increased to 19.  GI/DVT prophylaxis    #Progress Note Handoff    Pending:  Consults: Endocrinology  Tests: CBC and BMP in AM    Future Disposition: downgrade to general medical floor.  Anticipate discharge home in 24 hours if FS remain stable

## 2020-01-16 NOTE — PROGRESS NOTE ADULT - ASSESSMENT
IMPRESSION:    SEVERE DKA/ NON COMPLIANT WITH INSULIN BETTER  ALHAJI improving      PLAN:    CNS: AVOID DEPRESSANTS    HEENT:  Oral care    PULMONARY:  HOB @ 45 degrees     CARDIOVASCULAR:  no IVF    GI: GI prophylaxis                                          Feeding carb consistent    RENAL:  F/u  lytes.  Correct as needed. accurate I/O    INFECTIOUS DISEASE: cx -ve    HEMATOLOGICAL:  DVT prophylaxis.    ENDOCRINE:  Follow up FS. Endo input appreciated. lantus am/pm as / endo    CODE STATUS: FULL CODE    downgrade to medical floor

## 2020-01-17 ENCOUNTER — TRANSCRIPTION ENCOUNTER (OUTPATIENT)
Age: 35
End: 2020-01-17

## 2020-01-17 VITALS
HEART RATE: 76 BPM | TEMPERATURE: 99 F | RESPIRATION RATE: 14 BRPM | DIASTOLIC BLOOD PRESSURE: 55 MMHG | SYSTOLIC BLOOD PRESSURE: 122 MMHG

## 2020-01-17 LAB
ALBUMIN SERPL ELPH-MCNC: 3.2 G/DL — LOW (ref 3.5–5.2)
ALP SERPL-CCNC: 79 U/L — SIGNIFICANT CHANGE UP (ref 30–115)
ALT FLD-CCNC: 27 U/L — SIGNIFICANT CHANGE UP (ref 0–41)
ANION GAP SERPL CALC-SCNC: 11 MMOL/L — SIGNIFICANT CHANGE UP (ref 7–14)
AST SERPL-CCNC: 47 U/L — HIGH (ref 0–41)
BASOPHILS # BLD AUTO: 0.01 K/UL — SIGNIFICANT CHANGE UP (ref 0–0.2)
BASOPHILS NFR BLD AUTO: 0.3 % — SIGNIFICANT CHANGE UP (ref 0–1)
BILIRUB SERPL-MCNC: 0.2 MG/DL — SIGNIFICANT CHANGE UP (ref 0.2–1.2)
BUN SERPL-MCNC: 8 MG/DL — LOW (ref 10–20)
CALCIUM SERPL-MCNC: 8.4 MG/DL — LOW (ref 8.5–10.1)
CHLORIDE SERPL-SCNC: 109 MMOL/L — SIGNIFICANT CHANGE UP (ref 98–110)
CO2 SERPL-SCNC: 26 MMOL/L — SIGNIFICANT CHANGE UP (ref 17–32)
CREAT SERPL-MCNC: 0.7 MG/DL — SIGNIFICANT CHANGE UP (ref 0.7–1.5)
EOSINOPHIL # BLD AUTO: 0.05 K/UL — SIGNIFICANT CHANGE UP (ref 0–0.7)
EOSINOPHIL NFR BLD AUTO: 1.6 % — SIGNIFICANT CHANGE UP (ref 0–8)
GLUCOSE BLDC GLUCOMTR-MCNC: 232 MG/DL — HIGH (ref 70–99)
GLUCOSE BLDC GLUCOMTR-MCNC: 74 MG/DL — SIGNIFICANT CHANGE UP (ref 70–99)
GLUCOSE BLDC GLUCOMTR-MCNC: 76 MG/DL — SIGNIFICANT CHANGE UP (ref 70–99)
GLUCOSE SERPL-MCNC: 52 MG/DL — LOW (ref 70–99)
HCT VFR BLD CALC: 32.3 % — LOW (ref 37–47)
HGB BLD-MCNC: 10.5 G/DL — LOW (ref 12–16)
IMM GRANULOCYTES NFR BLD AUTO: 0.3 % — SIGNIFICANT CHANGE UP (ref 0.1–0.3)
LYMPHOCYTES # BLD AUTO: 1 K/UL — LOW (ref 1.2–3.4)
LYMPHOCYTES # BLD AUTO: 31.6 % — SIGNIFICANT CHANGE UP (ref 20.5–51.1)
MCHC RBC-ENTMCNC: 27.8 PG — SIGNIFICANT CHANGE UP (ref 27–31)
MCHC RBC-ENTMCNC: 32.5 G/DL — SIGNIFICANT CHANGE UP (ref 32–37)
MCV RBC AUTO: 85.4 FL — SIGNIFICANT CHANGE UP (ref 81–99)
MONOCYTES # BLD AUTO: 0.41 K/UL — SIGNIFICANT CHANGE UP (ref 0.1–0.6)
MONOCYTES NFR BLD AUTO: 13 % — HIGH (ref 1.7–9.3)
NEUTROPHILS # BLD AUTO: 1.68 K/UL — SIGNIFICANT CHANGE UP (ref 1.4–6.5)
NEUTROPHILS NFR BLD AUTO: 53.2 % — SIGNIFICANT CHANGE UP (ref 42.2–75.2)
NRBC # BLD: 0 /100 WBCS — SIGNIFICANT CHANGE UP (ref 0–0)
PLATELET # BLD AUTO: 253 K/UL — SIGNIFICANT CHANGE UP (ref 130–400)
POTASSIUM SERPL-MCNC: 3.9 MMOL/L — SIGNIFICANT CHANGE UP (ref 3.5–5)
POTASSIUM SERPL-SCNC: 3.9 MMOL/L — SIGNIFICANT CHANGE UP (ref 3.5–5)
PROT SERPL-MCNC: 5.8 G/DL — LOW (ref 6–8)
RBC # BLD: 3.78 M/UL — LOW (ref 4.2–5.4)
RBC # FLD: 14.4 % — SIGNIFICANT CHANGE UP (ref 11.5–14.5)
SODIUM SERPL-SCNC: 146 MMOL/L — SIGNIFICANT CHANGE UP (ref 135–146)
WBC # BLD: 3.16 K/UL — LOW (ref 4.8–10.8)
WBC # FLD AUTO: 3.16 K/UL — LOW (ref 4.8–10.8)

## 2020-01-17 PROCEDURE — 99239 HOSP IP/OBS DSCHRG MGMT >30: CPT

## 2020-01-17 RX ORDER — INSULIN GLARGINE 100 [IU]/ML
20 INJECTION, SOLUTION SUBCUTANEOUS
Qty: 0 | Refills: 0 | DISCHARGE

## 2020-01-17 RX ORDER — METFORMIN HYDROCHLORIDE 850 MG/1
1 TABLET ORAL
Qty: 0 | Refills: 0 | DISCHARGE

## 2020-01-17 RX ORDER — INSULIN GLARGINE 100 [IU]/ML
25 INJECTION, SOLUTION SUBCUTANEOUS
Qty: 0 | Refills: 0 | DISCHARGE

## 2020-01-17 RX ORDER — INSULIN LISPRO 100/ML
5 VIAL (ML) SUBCUTANEOUS
Qty: 0 | Refills: 0 | DISCHARGE

## 2020-01-17 RX ORDER — INSULIN LISPRO 100/ML
10 VIAL (ML) SUBCUTANEOUS
Qty: 0 | Refills: 0 | DISCHARGE

## 2020-01-17 RX ORDER — INSULIN GLARGINE 100 [IU]/ML
20 INJECTION, SOLUTION SUBCUTANEOUS
Qty: 0 | Refills: 0 | DISCHARGE
Start: 2020-01-17

## 2020-01-17 RX ADMIN — Medication 2: at 11:51

## 2020-01-17 RX ADMIN — CHLORHEXIDINE GLUCONATE 1 APPLICATION(S): 213 SOLUTION TOPICAL at 05:39

## 2020-01-17 RX ADMIN — PANTOPRAZOLE SODIUM 40 MILLIGRAM(S): 20 TABLET, DELAYED RELEASE ORAL at 05:39

## 2020-01-17 RX ADMIN — Medication 5 UNIT(S): at 11:51

## 2020-01-17 NOTE — DISCHARGE NOTE PROVIDER - NSDCMRMEDTOKEN_GEN_ALL_CORE_FT
HumaLOG 100 units/mL subcutaneous solution: 10 unit(s) subcutaneous 3 times a day  insulin glargine 100 units/mL subcutaneous solution: 25 unit(s) subcutaneous once a day (at bedtime)  metFORMIN 500 mg oral tablet: 1 tab(s) orally 2 times a day HumaLOG 100 units/mL subcutaneous solution: 10 unit(s) subcutaneous 3 times a day  metFORMIN 500 mg oral tablet: 1 tab(s) orally 2 times a day

## 2020-01-17 NOTE — PROGRESS NOTE ADULT - ATTENDING COMMENTS
can go home on basal and bolus insulin before meals. she does not want insulin pump therapy. will try and get continuos glucose monitor if insurance approves. .follow up in clinic extn. [phone]
follow my recommendations from yesterday, can stop intravenous insulin.
patient seen and examined, agree with above, DKA improved, transfer to floor

## 2020-01-17 NOTE — DISCHARGE NOTE PROVIDER - NSFOLLOWUPCLINICS_GEN_ALL_ED_FT
[facility]  Medicine  [address], [place]   Phone: [phone]  Fax:   Follow Up Time: [facility]  Medicine  [address], [place]   Phone: [phone]  Fax:   Follow Up Time: 1 week

## 2020-01-17 NOTE — PROGRESS NOTE ADULT - SUBJECTIVE AND OBJECTIVE BOX
RAJENDRA PRINCE MRN-[id number]    Hospitalist Note  33yo F with Past Medical History DMI admitted with dyspnea accompanied by abdominal pain, nausea, and vomiting secondary to acute DKA.  The patients admits to non-compliance with her insulin prior to admission.  She was severely acidotic (pH 6.9) with AG 37 on admission.  Her AG and acidosis improved following treatment with aggressive IV hydration and insulin gtt.    Overnight events/Updates: The patient suffered an episode of hypoglycemia yesterday afternoon.  We decreased her Lantus to 20u at bedtime.    Vital Signs Last 24 Hrs  T(C): 37 (17 Jan 2020 12:22), Max: 37.2 (16 Jan 2020 16:00)  T(F): 98.6 (17 Jan 2020 12:22), Max: 99 (16 Jan 2020 16:00)  HR: 76 (17 Jan 2020 12:22) (75 - 100)  BP: 122/55 (17 Jan 2020 12:22) (101/58 - 123/62)  BP(mean): 80 (16 Jan 2020 18:00) (80 - 89)  RR: 14 (17 Jan 2020 12:22) (14 - 28)  SpO2: 99% (17 Jan 2020 07:47) (98% - 100%)    Physical Examination:  General: AAO x 3  HEENT: PERRLA, EOMI  CV= S1 & S2 appreciated  Lungs= CTA BL  Abdominal Examination= + BS, Soft, NT/ND  Extremity Examination= No C/C/E    ROS: No chest pain, no shortness of breath.  All other systems reviewed and are within normal limits except for the complaints in the HPI.    MEDICATIONS  (STANDING):  ALBUTerol    0.083% 2.5 milliGRAM(s) Nebulizer every 6 hours  chlorhexidine 4% Liquid 1 Application(s) Topical <User Schedule>  dextrose 50% Injectable 50 milliLiter(s) IV Push once  dextrose 50% Injectable 12.5 Gram(s) IV Push once  dextrose 50% Injectable 25 Gram(s) IV Push once  dextrose 50% Injectable 25 Gram(s) IV Push once  heparin  Injectable 5000 Unit(s) SubCutaneous every 8 hours  influenza   Vaccine 0.5 milliLiter(s) IntraMuscular once  insulin glargine Injectable (LANTUS) 20 Unit(s) SubCutaneous at bedtime  insulin lispro (HumaLOG) corrective regimen sliding scale   SubCutaneous three times a day before meals  insulin lispro Injectable (HumaLOG) 5 Unit(s) SubCutaneous three times a day before meals  pantoprazole    Tablet 40 milliGRAM(s) Oral before breakfast    MEDICATIONS  (PRN):  dextrose 40% Gel 15 Gram(s) Oral once PRN Blood Glucose LESS THAN 70 milliGRAM(s)/deciliter  glucagon  Injectable 1 milliGRAM(s) IntraMuscular once PRN Glucose LESS THAN 70 milligrams/deciliter                            10.5   3.16  )-----------( 253      ( 17 Jan 2020 07:01 )             32.3     01-17    146  |  109  |  8<L>  ----------------------------<  52<L>  3.9   |  26  |  0.7    Ca    8.4<L>      17 Jan 2020 07:01    TPro  5.8<L>  /  Alb  3.2<L>  /  TBili  0.2  /  DBili  x   /  AST  47<H>  /  ALT  27  /  AlkPhos  79  01-17      Case discussed with housestaff & family  JER Longo [phone]

## 2020-01-17 NOTE — DISCHARGE NOTE NURSING/CASE MANAGEMENT/SOCIAL WORK - PATIENT PORTAL LINK FT
You can access the FollowMyHealth Patient Portal offered by [facility] by registering at the following website: [email or web address]. By joining [employer]’s FollowMyHealth portal, you will also be able to view your health information using other applications (apps) compatible with our system.

## 2020-01-17 NOTE — DISCHARGE NOTE PROVIDER - NSDCFUADDAPPT_GEN_ALL_CORE_FT
Please call [phone] to arrange an appointment in the endocrinology clinic here at the hospital within 1-2 weeks of discharge. Please call [phone] to arrange an appointment in the endocrinology clinic here at the hospital within 1-2 weeks of discharge.    Please call the [facility] medicine clinic to schedule an appointment for a PMD within 1-2 weeks of discharge. Please call [phone] to arrange an appointment in the endocrinology clinic here at the hospital within 1-2 weeks of discharge.    There is a PMD appointment scheduled for you at the [facility] medicine clinic on January 24th @ 1:30pm. Please make sure you attend this appointment.

## 2020-01-17 NOTE — PROGRESS NOTE ADULT - ASSESSMENT
35yo F with Past Medical History DMI admitted with dyspnea accompanied by abdominal pain, nausea, and vomiting secondary to acute DKA.      DKA with DMI: AG has normalized from admission.  Her DKA was triggered by non-compliance with her outpatient medications.  Lantus was decreased to 20u at bedtime with Lispro 5u with meals.  Continue CHO consistent diet.  Follow-up with endocrinology as outpatient.  Acute kidney injury/metabolic acidosis: renal function normalized from admission following treatment with aggressive hydration.  Metabolic acidosis has resolved  GI/DVT prophylaxis    #Progress Note Handoff    Pending:    Future Disposition: Discharge home; time spent = 35 minutes

## 2020-01-17 NOTE — PROGRESS NOTE ADULT - SUBJECTIVE AND OBJECTIVE BOX
Reason for Endocrinology Consult: Diabetes    HPI: 34y Female    Diabetes Type:  Duration of Diabetes:  Diabetes Complications:  History of DKA?  Eye doctor within past year?  Current Therapy:      Home FSG:  Fasting:  Lunch:  Dinner:  Bedtime:    Hypoglycemia?    Neuroglycopenia within past year?    Outpatient Endocrinologist?    PAST MEDICAL & SURGICAL HISTORY:  Diabetes    FAMILY HISTORY:      SH:  Smoking  Etoh:  Recreational Drugs:    Home Medications:  HumaLOG 100 units/mL subcutaneous solution: 10 unit(s) subcutaneous 3 times a day (13 Jan 2020 19:38)  insulin glargine 100 units/mL subcutaneous solution: 25 unit(s) subcutaneous once a day (at bedtime) (13 Jan 2020 19:39)  metFORMIN 500 mg oral tablet: 1 tab(s) orally 2 times a day (13 Jan 2020 19:39)      Current (Non-Endocrine) Meds:  ALBUTerol    0.083% 2.5 milliGRAM(s) Nebulizer every 6 hours  chlorhexidine 4% Liquid 1 Application(s) Topical <User Schedule>  heparin  Injectable 5000 Unit(s) SubCutaneous every 8 hours  influenza   Vaccine 0.5 milliLiter(s) IntraMuscular once  pantoprazole    Tablet 40 milliGRAM(s) Oral before breakfast      Current Endocrine Meds:   dextrose 40% Gel 15 Gram(s) Oral once PRN  dextrose 50% Injectable 50 milliLiter(s) IV Push once  dextrose 50% Injectable 12.5 Gram(s) IV Push once  dextrose 50% Injectable 25 Gram(s) IV Push once  dextrose 50% Injectable 25 Gram(s) IV Push once  glucagon  Injectable 1 milliGRAM(s) IntraMuscular once PRN  insulin glargine Injectable (LANTUS) 20 Unit(s) SubCutaneous at bedtime  insulin lispro (HumaLOG) corrective regimen sliding scale   SubCutaneous three times a day before meals  insulin lispro Injectable (HumaLOG) 5 Unit(s) SubCutaneous three times a day before meals      Allergies:  No Known Allergies      ROS:  Denies the following except as indicated.    General: weight loss/weight gain, decreased appetite, fatigue, fever  Eyes: blurry vision, double vision  ENT: neck swelling, dysphagia, voice changes   CV: palpitations, SOB, chest pain, cough  GI: nausea, vomiting, diarrhea, constipation, abdominal pain  : nocturia,  polyuria, dysuria  Endo: decreased libido, heat/cold intolerance, jitteriness  MSK: arthralgias, myalgias  Skin: rash, dryness, diaphoresis  Neuro: pedal numbness,pedal paresthesias, pedal pain    Height (cm): 160 (01-16 @ 19:09)  Weight (kg): 62.7 (01-16 @ 19:09)  BMI (kg/m2): 24.5 (01-16 @ 19:09)    Vital Signs Last 24 Hrs  T(C): 36.6 (17 Jan 2020 06:00), Max: 37.2 (16 Jan 2020 16:00)  T(F): 97.9 (17 Jan 2020 06:00), Max: 99 (16 Jan 2020 16:00)  HR: 75 (17 Jan 2020 06:00) (75 - 100)  BP: 101/58 (17 Jan 2020 06:00) (101/58 - 123/62)  BP(mean): 80 (16 Jan 2020 18:00) (80 - 100)  RR: 18 (17 Jan 2020 06:00) (15 - 28)  SpO2: 99% (17 Jan 2020 07:47) (98% - 100%)  Constitutional: WN/WD in NAD.   Neck: no thyromegaly or palpable thyroid nodules   Respiratory: lungs CTAB.  Cardiovascular: regular rate and rhythm, normal S1 and S2, no audible murmurs  GI: soft, NT/ND, no masses/HSM appreciated.  Ext: no edema, no ulcers, pedal pulses palpable bilaterally  Neurology: no tremor, monofilament sensation intact in feet  Psychiatric: A&O x 3, normal affect/mood.        LABS:                        10.5   3.16  )-----------( 253      ( 17 Jan 2020 07:01 )             32.3     01-17    146  |  109  |  8<L>  ----------------------------<  52<L>  3.9   |  26  |  0.7    Ca    8.4<L>      17 Jan 2020 07:01    TPro  5.8<L>  /  Alb  3.2<L>  /  TBili  0.2  /  DBili  x   /  AST  47<H>  /  ALT  27  /  AlkPhos  79  01-17        Hemoglobin A1C, Whole Blood: 10.9 (01-16 @ 04:23)                                 RADIOLOGY & ADDITIONAL STUDIES:    A/P:34yFemale    1.  DM  For now:  Glargine-    units at bedtime  Lispro-    units three times a day before meals   Will continue to monitor     At discharge, recommend:      Pt can follow up at discharge with:    Above discussed with resident.

## 2020-01-17 NOTE — DISCHARGE NOTE PROVIDER - NSDCCPCAREPLAN_GEN_ALL_CORE_FT
PRINCIPAL DISCHARGE DIAGNOSIS  Diagnosis: Diabetic ketoacidosis with coma associated with type 1 diabetes mellitus  Assessment and Plan of Treatment: You were admitted for diabetic ketoacidosis, which means that your sugars were very elevated causing multiple electrolyte derangements and problems with breathing. This was likely due to running out of insulin. you were initially admitted to the ICU and started n an insulin drip, which was discontinued once your fingerstick values normalized. You were then started on basal-bolus insulin (long-acting and short-acting) with stabilization of your fingersticks.  It is imperative that you keep taking your insulin as directed. It will be sent to your pharmacy. If there are any issues with your insulin prescription, please contact your physician immediately.  Please follow-up in endocrinology clinic here at the hospital within 1-2 weeks of discharge. Please routinely check your fingersticks at home and record them in a diary.   If you experience any nausea/vomiting, abdominal pain, headache, or frequent urination, please speak with a physician immediately.      SECONDARY DISCHARGE DIAGNOSES  Diagnosis: ALHAJI (acute kidney injury)  Assessment and Plan of Treatment: You were found to have an acute kidney injury which was likely due to dehydration from the DKA. It resolved after giving you IV fluids. Please ensure you drink plenty of fluids. If you experience any frequent urination, burning with urination, or not urinating enough, speak with a physician immediately.

## 2020-01-17 NOTE — DISCHARGE NOTE NURSING/CASE MANAGEMENT/SOCIAL WORK - NSDCFUADDAPPT_GEN_ALL_CORE_FT
Please call [phone] to arrange an appointment in the endocrinology clinic here at the hospital within 1-2 weeks of discharge.    There is a PMD appointment scheduled for you at the [facility] medicine clinic on January 24th @ 1:30pm. Please make sure you attend this appointment.

## 2020-01-17 NOTE — DISCHARGE NOTE PROVIDER - HOSPITAL COURSE
Patient is a 34y old female with a PMHx DM Type I, noncompliant, who presented 1/13 with dizziness, headache, and generalized weakness for 2 days. Pt also reports substernal chest pain that worsens when speaking. She states she has not taken insulin since 1/11 due to difficulties renewing her prescription after she moved from [place] 1 month ago. She states she sees an endocrinologist, Dr. Salgado, on [place]. She states that since her diagnosis of DM Type I in 2014, she has been admitted to the ICU 12 times with DKA.    In the ED, she was found to have HAGMA (AG 37), ketones >9.0 and diagnosed with DKA. K was 7.2 and EKG showed hyperacute T waves. She received two boluses of 2 L LR, as well as calcium gluconate, improving her potassium levels and EKG findings. She was upgraded to CCU, where she received a continuous insulin drip and D5 1/2 NS. Her anion gap closed and fingersticks normalized and she was initiated on Lantus 17 U on 1/15. Her insulin drip and fluids were d/c'd and she resumed regular diet, tolerating PO intake well. Her symptoms had improved and she was prepared to be downgraded. Her noon labs then revealed an increase in AG (16) and she was resumed on insulin drip, which was d/c'd on 1/16 and Lantus 24 U SQ was initiated. She reports she feels well and has no complaints.    Her stay was complicated by ALHAJI, her Cr was 1.7 on admission and came down as she improved, currently 0.9. She was also found to have leukocytosis (WBC 25) on admission, but continues to have no clinical signs of infection. UA was negative and blood cultures are NGTD, leading to the likely cause being longstanding noncompliance to her DM regimen.     Patient's anion gap remained closed on the floor. She was cleared for discharge by endocrinology, and will need to follow-up in endocrine clinic for further management of her diabetes.

## 2020-01-19 LAB
CULTURE RESULTS: SIGNIFICANT CHANGE UP
CULTURE RESULTS: SIGNIFICANT CHANGE UP
SPECIMEN SOURCE: SIGNIFICANT CHANGE UP
SPECIMEN SOURCE: SIGNIFICANT CHANGE UP

## 2020-01-22 DIAGNOSIS — E87.5 HYPERKALEMIA: ICD-10-CM

## 2020-01-22 DIAGNOSIS — D72.829 ELEVATED WHITE BLOOD CELL COUNT, UNSPECIFIED: ICD-10-CM

## 2020-01-22 DIAGNOSIS — Z91.14 PATIENT'S OTHER NONCOMPLIANCE WITH MEDICATION REGIMEN: ICD-10-CM

## 2020-01-22 DIAGNOSIS — E10.11 TYPE 1 DIABETES MELLITUS WITH KETOACIDOSIS WITH COMA: ICD-10-CM

## 2020-01-22 DIAGNOSIS — E10.10 TYPE 1 DIABETES MELLITUS WITH KETOACIDOSIS WITHOUT COMA: ICD-10-CM

## 2020-01-22 DIAGNOSIS — N17.9 ACUTE KIDNEY FAILURE, UNSPECIFIED: ICD-10-CM

## 2020-01-24 PROBLEM — E11.9 TYPE 2 DIABETES MELLITUS WITHOUT COMPLICATIONS: Chronic | Status: ACTIVE | Noted: 2020-01-13

## 2020-01-29 ENCOUNTER — APPOINTMENT (OUTPATIENT)
Dept: ENDOCRINOLOGY | Facility: CLINIC | Age: 35
End: 2020-01-29

## 2020-01-29 ENCOUNTER — OUTPATIENT (OUTPATIENT)
Dept: OUTPATIENT SERVICES | Facility: HOSPITAL | Age: 35
LOS: 1 days | Discharge: HOME | End: 2020-01-29

## 2020-01-29 VITALS
HEIGHT: 63 IN | HEART RATE: 74 BPM | WEIGHT: 135 LBS | SYSTOLIC BLOOD PRESSURE: 113 MMHG | BODY MASS INDEX: 23.92 KG/M2 | DIASTOLIC BLOOD PRESSURE: 72 MMHG

## 2020-02-05 NOTE — HISTORY OF PRESENT ILLNESS
[FreeTextEntry1] : [place] [facility] [place], ran out of admelog, developed ketoacidosis, glucoses are still high.

## 2020-02-05 NOTE — ASSESSMENT
[Carbohydrate Consistent Diet] : carbohydrate consistent diet [Long Term Vascular Complications] : long term vascular complications of diabetes [Diabetes Foot Care] : diabetes foot care [Hypoglycemia Management] : hypoglycemia management [Action and use of Insulin] : action and use of short and long-acting insulin [Importance of Diet and Exercise] : importance of diet and exercise to improve glycemic control, achieve weight loss and improve cardiovascular health [FreeTextEntry1] : refusing insulin pump, will ltry for CGM. needs diabetes education. patient checks fingerstick blood glucoses 4 times daily, takes 4 insulin injections daily, and uses the glucose measurements to adjust insulin dosage.

## 2020-02-05 NOTE — PHYSICAL EXAM
[No Acute Distress] : no acute distress [Alert] : alert [Well Developed] : well developed [Normal Sclera/Conjunctiva] : normal sclera/conjunctiva [Well Nourished] : well nourished [EOMI] : extra ocular movement intact [No Proptosis] : no proptosis [Normal Oropharynx] : the oropharynx was normal [Thyroid Not Enlarged] : the thyroid was not enlarged [No Thyroid Nodules] : there were no palpable thyroid nodules [No Respiratory Distress] : no respiratory distress [No Accessory Muscle Use] : no accessory muscle use [Clear to Auscultation] : lungs were clear to auscultation bilaterally [Normal Rate] : heart rate was normal  [Normal S1, S2] : normal S1 and S2 [Regular Rhythm] : with a regular rhythm [Pedal Pulses Normal] : the pedal pulses are present [No Edema] : there was no peripheral edema [Normal Bowel Sounds] : normal bowel sounds [Not Tender] : non-tender [Soft] : abdomen soft [Not Distended] : not distended [Post Cervical Nodes] : posterior cervical nodes [Anterior Cervical Nodes] : anterior cervical nodes [Axillary Nodes] : axillary nodes [Normal] : normal and non tender [No Spinal Tenderness] : no spinal tenderness [Spine Straight] : spine straight [No Stigmata of Cushings Syndrome] : no stigmata of cushings syndrome [Normal Strength/Tone] : muscle strength and tone were normal [Normal Gait] : normal gait [No Rash] : no rash [No Tremors] : no tremors [Normal Reflexes] : deep tendon reflexes were 2+ and symmetric [Oriented x3] : oriented to person, place, and time [Acanthosis Nigricans] : no acanthosis nigricans

## 2020-02-07 ENCOUNTER — APPOINTMENT (OUTPATIENT)
Dept: INTERNAL MEDICINE | Facility: CLINIC | Age: 35
End: 2020-02-07

## 2020-02-12 ENCOUNTER — APPOINTMENT (OUTPATIENT)
Dept: NUTRITION | Facility: CLINIC | Age: 35
End: 2020-02-12

## 2020-04-30 ENCOUNTER — APPOINTMENT (OUTPATIENT)
Dept: ENDOCRINOLOGY | Facility: CLINIC | Age: 35
End: 2020-04-30

## 2020-06-15 ENCOUNTER — OUTPATIENT (OUTPATIENT)
Dept: OUTPATIENT SERVICES | Facility: HOSPITAL | Age: 35
LOS: 1 days | Discharge: HOME | End: 2020-06-15
Payer: MEDICAID

## 2020-06-15 PROCEDURE — 92250 FUNDUS PHOTOGRAPHY W/I&R: CPT | Mod: 26

## 2020-06-15 PROCEDURE — 92004 COMPRE OPH EXAM NEW PT 1/>: CPT

## 2020-06-15 PROCEDURE — 92202 OPSCPY EXTND ON/MAC DRAW: CPT

## 2020-06-17 DIAGNOSIS — E11.9 TYPE 2 DIABETES MELLITUS WITHOUT COMPLICATIONS: ICD-10-CM

## 2020-06-17 DIAGNOSIS — H52.4 PRESBYOPIA: ICD-10-CM

## 2020-06-17 DIAGNOSIS — H35.89 OTHER SPECIFIED RETINAL DISORDERS: ICD-10-CM

## 2020-07-20 ENCOUNTER — OUTPATIENT (OUTPATIENT)
Dept: OUTPATIENT SERVICES | Facility: HOSPITAL | Age: 35
LOS: 1 days | Discharge: HOME | End: 2020-07-20
Payer: MEDICAID

## 2020-07-20 PROCEDURE — 92012 INTRM OPH EXAM EST PATIENT: CPT

## 2020-08-17 ENCOUNTER — APPOINTMENT (OUTPATIENT)
Dept: INTERNAL MEDICINE | Facility: CLINIC | Age: 35
End: 2020-08-17

## 2020-09-02 ENCOUNTER — APPOINTMENT (OUTPATIENT)
Dept: ENDOCRINOLOGY | Facility: CLINIC | Age: 35
End: 2020-09-02

## 2020-11-28 ENCOUNTER — RX RENEWAL (OUTPATIENT)
Age: 35
End: 2020-11-28

## 2020-12-14 ENCOUNTER — APPOINTMENT (OUTPATIENT)
Dept: OPHTHALMOLOGY | Facility: CLINIC | Age: 35
End: 2020-12-14

## 2021-01-08 ENCOUNTER — APPOINTMENT (OUTPATIENT)
Dept: OPHTHALMOLOGY | Facility: CLINIC | Age: 36
End: 2021-01-08

## 2021-01-08 ENCOUNTER — OUTPATIENT (OUTPATIENT)
Dept: OUTPATIENT SERVICES | Facility: HOSPITAL | Age: 36
LOS: 1 days | Discharge: HOME | End: 2021-01-08
Payer: COMMERCIAL

## 2021-01-08 PROCEDURE — 92202 OPSCPY EXTND ON/MAC DRAW: CPT

## 2021-01-08 PROCEDURE — 92134 CPTRZ OPH DX IMG PST SGM RTA: CPT | Mod: 26

## 2021-01-08 PROCEDURE — 92012 INTRM OPH EXAM EST PATIENT: CPT

## 2021-01-09 ENCOUNTER — RX RENEWAL (OUTPATIENT)
Age: 36
End: 2021-01-09

## 2021-01-09 RX ORDER — PEN NEEDLE, DIABETIC 29 G X1/2"
31G X 5 MM NEEDLE, DISPOSABLE MISCELLANEOUS
Qty: 100 | Refills: 0 | Status: ACTIVE | COMMUNITY
Start: 2020-01-29 | End: 1900-01-01

## 2021-01-11 ENCOUNTER — OUTPATIENT (OUTPATIENT)
Dept: OUTPATIENT SERVICES | Facility: HOSPITAL | Age: 36
LOS: 1 days | Discharge: HOME | End: 2021-01-11

## 2021-01-11 ENCOUNTER — APPOINTMENT (OUTPATIENT)
Dept: INTERNAL MEDICINE | Facility: CLINIC | Age: 36
End: 2021-01-11
Payer: MEDICAID

## 2021-01-11 VITALS
TEMPERATURE: 98.8 F | BODY MASS INDEX: 27.11 KG/M2 | DIASTOLIC BLOOD PRESSURE: 72 MMHG | HEART RATE: 78 BPM | HEIGHT: 63 IN | SYSTOLIC BLOOD PRESSURE: 111 MMHG | WEIGHT: 153 LBS | OXYGEN SATURATION: 99 %

## 2021-01-11 DIAGNOSIS — E10.65 TYPE 1 DIABETES MELLITUS WITH HYPERGLYCEMIA: ICD-10-CM

## 2021-01-11 DIAGNOSIS — Z23 ENCOUNTER FOR IMMUNIZATION: ICD-10-CM

## 2021-01-11 PROCEDURE — 99203 OFFICE O/P NEW LOW 30 MIN: CPT | Mod: GC

## 2021-01-13 PROBLEM — Z23 ENCOUNTER FOR IMMUNIZATION: Status: ACTIVE | Noted: 2021-01-11

## 2021-01-13 NOTE — PLAN
[FreeTextEntry1] : Continue Insulin as per endocrinology recommendations. \par Routine BW,  monitor Hgb A1C, referral to endocrinology, GYN, opthalmology given. Patient received Flu vaccine. \par

## 2021-01-13 NOTE — HISTORY OF PRESENT ILLNESS
[FreeTextEntry1] : Patient with h/o DM is here to establish care. She was already seen by Endocrinology.

## 2021-01-13 NOTE — ASSESSMENT
[Carbohydrate Consistent Diet] : carbohydrate consistent diet [Hypoglycemia Management] : hypoglycemia management [Diabetes Foot Care] : diabetes foot care [Long Term Vascular Complications] : long term vascular complications of diabetes [Importance of Diet and Exercise] : importance of diet and exercise to improve glycemic control, achieve weight loss and improve cardiovascular health [Action and use of Insulin] : action and use of short and long-acting insulin [FreeTextEntry1] : Patient was offered  and refused insulin pump.  . Patient checks fingerstick blood glucoses 4 times daily, takes 4 insulin injections daily, and uses the glucose measurements to adjust insulin dosage.

## 2021-02-03 ENCOUNTER — OUTPATIENT (OUTPATIENT)
Dept: OUTPATIENT SERVICES | Facility: HOSPITAL | Age: 36
LOS: 1 days | Discharge: HOME | End: 2021-02-03

## 2021-02-03 ENCOUNTER — APPOINTMENT (OUTPATIENT)
Dept: ENDOCRINOLOGY | Facility: CLINIC | Age: 36
End: 2021-02-03

## 2021-02-03 VITALS — HEART RATE: 94 BPM | SYSTOLIC BLOOD PRESSURE: 120 MMHG | DIASTOLIC BLOOD PRESSURE: 77 MMHG

## 2021-02-03 NOTE — REVIEW OF SYSTEMS
[SOB on Exertion] : shortness of breath on exertion [Fever] : no fever [Chills] : no chills [Chest Pain] : no chest pain [Palpitations] : no palpitations [Lower Ext Edema] : no lower extremity edema [Cough] : no cough [Nausea] : no nausea [Constipation] : no constipation [Abdominal Pain] : no abdominal pain [Heartburn] : no heartburn [Vomiting] : no vomiting [Diarrhea] : no diarrhea [Polyuria] : no polyuria [Dysuria] : no dysuria

## 2021-02-03 NOTE — HISTORY OF PRESENT ILLNESS
[FreeTextEntry1] : Patient is a 36 yo female w/ type 1 DM, here for follow up. Currently on Tresiba and Alfreza. Currently checks glucose w/ sensor, continuous monitoring. A1c 8.9 in January 2021, patient reports this is an improvement. Denies numbness/tingling, loss of sensation, dysuria, polyuria.

## 2021-02-03 NOTE — ASSESSMENT
[FreeTextEntry1] : #DM1\par - currently on Tresiba and Alfreza, AND ADMELOG SOLOSTAR.\par -last A1c 8.9 1/2021 .\par PATIENT CONTINUES ON DEXCOM CONTINUOUS GLUCOSE MONITOR FOR TYPE 1 DIABETES, PRIOR TO THIS SHE WAS CHECKING FINGERSTICK GLUCOSES 4 TIMES DAILY, AND UISING THOSE MEASUREMENTS TO ADJUST INSULIN DOSAGE. SHE IS ON 4 INSULIN INJECTIONS DAILY.

## 2021-02-09 ENCOUNTER — APPOINTMENT (OUTPATIENT)
Dept: OBGYN | Facility: CLINIC | Age: 36
End: 2021-02-09
Payer: MEDICAID

## 2021-02-09 ENCOUNTER — OUTPATIENT (OUTPATIENT)
Dept: OUTPATIENT SERVICES | Facility: HOSPITAL | Age: 36
LOS: 1 days | Discharge: HOME | End: 2021-02-09

## 2021-02-09 VITALS — DIASTOLIC BLOOD PRESSURE: 70 MMHG | SYSTOLIC BLOOD PRESSURE: 110 MMHG | BODY MASS INDEX: 26.75 KG/M2 | WEIGHT: 151 LBS

## 2021-02-09 DIAGNOSIS — Z83.3 FAMILY HISTORY OF DIABETES MELLITUS: ICD-10-CM

## 2021-02-09 PROCEDURE — 99385 PREV VISIT NEW AGE 18-39: CPT

## 2021-02-09 NOTE — COUNSELING
[Breast Self Exam] : breast self exam [Nutrition/ Exercise/ Weight Management] : nutrition, exercise, weight management [STD (testing, results, tx)] : STD (testing, results, tx)

## 2021-02-09 NOTE — HISTORY OF PRESENT ILLNESS
[Patient reported PAP Smear was normal] : Patient reported PAP Smear was normal [Regular Cycle Intervals] : periods have been regular [Frequency: Q ___ days] : menstrual periods occur approximately every [unfilled] days [Menarche Age: ____] : age at menarche was [unfilled] [Currently Active] : currently active [Men] : men [Vaginal] : vaginal [No] : No [PapSmeardate] : 2020 [FreeTextEntry1] : 1/15/21

## 2021-02-09 NOTE — PLAN
[FreeTextEntry1] : pt feels well no complaints. declined STD screening, \par PAP/HPV f/u 1 year or PRN

## 2021-02-12 LAB
CYTOLOGY CVX/VAG DOC THIN PREP: NORMAL
HPV HIGH+LOW RISK DNA PNL CVX: NOT DETECTED

## 2021-03-01 ENCOUNTER — RX RENEWAL (OUTPATIENT)
Age: 36
End: 2021-03-01

## 2021-03-09 ENCOUNTER — NON-APPOINTMENT (OUTPATIENT)
Age: 36
End: 2021-03-09

## 2021-03-09 RX ORDER — NITROFURANTOIN (MONOHYDRATE/MACROCRYSTALS) 25; 75 MG/1; MG/1
100 CAPSULE ORAL TWICE DAILY
Qty: 14 | Refills: 0 | Status: ACTIVE | COMMUNITY
Start: 2021-03-09 | End: 1900-01-01

## 2021-05-03 LAB
ALBUMIN SERPL ELPH-MCNC: 4 G/DL
ALP BLD-CCNC: 89 U/L
ALT SERPL-CCNC: 9 U/L
ANION GAP SERPL CALC-SCNC: 18 MMOL/L
AST SERPL-CCNC: 13 U/L
BASOPHILS # BLD AUTO: 0.02 K/UL
BASOPHILS NFR BLD AUTO: 0.4 %
BILIRUB SERPL-MCNC: 0.3 MG/DL
BUN SERPL-MCNC: 14 MG/DL
CALCIUM SERPL-MCNC: 9.2 MG/DL
CHLORIDE SERPL-SCNC: 97 MMOL/L
CHOLEST SERPL-MCNC: 231 MG/DL
CO2 SERPL-SCNC: 18 MMOL/L
CREAT SERPL-MCNC: 0.76 MG/DL
EOSINOPHIL # BLD AUTO: 0.26 K/UL
EOSINOPHIL NFR BLD AUTO: 5.1 %
ESTIMATED AVERAGE GLUCOSE: 280 MG/DL
GLUCOSE SERPL-MCNC: 359 MG/DL
HBA1C MFR BLD HPLC: 11.4 %
HCT VFR BLD CALC: 38.9 %
HDLC SERPL-MCNC: 104 MG/DL
HGB BLD-MCNC: 11.5 G/DL
IMM GRANULOCYTES NFR BLD AUTO: 0.2 %
LDLC SERPL CALC-MCNC: 113 MG/DL
LYMPHOCYTES # BLD AUTO: 1.61 K/UL
LYMPHOCYTES NFR BLD AUTO: 31.5 %
MAN DIFF?: NORMAL
MCHC RBC-ENTMCNC: 27.3 PG
MCHC RBC-ENTMCNC: 29.6 GM/DL
MCV RBC AUTO: 92.4 FL
MONOCYTES # BLD AUTO: 0.56 K/UL
MONOCYTES NFR BLD AUTO: 11 %
NEUTROPHILS # BLD AUTO: 2.65 K/UL
NEUTROPHILS NFR BLD AUTO: 51.8 %
NONHDLC SERPL-MCNC: 128 MG/DL
PLATELET # BLD AUTO: 339 K/UL
POTASSIUM SERPL-SCNC: 5 MMOL/L
PROT SERPL-MCNC: 6.7 G/DL
RBC # BLD: 4.21 M/UL
RBC # FLD: 13.7 %
SODIUM SERPL-SCNC: 133 MMOL/L
TRIGL SERPL-MCNC: 75 MG/DL
TSH SERPL-ACNC: 1.06 UIU/ML
WBC # FLD AUTO: 5.11 K/UL

## 2021-05-05 ENCOUNTER — APPOINTMENT (OUTPATIENT)
Dept: INTERNAL MEDICINE | Facility: CLINIC | Age: 36
End: 2021-05-05
Payer: MEDICAID

## 2021-05-05 ENCOUNTER — OUTPATIENT (OUTPATIENT)
Dept: OUTPATIENT SERVICES | Facility: HOSPITAL | Age: 36
LOS: 1 days | Discharge: HOME | End: 2021-05-05

## 2021-05-05 ENCOUNTER — APPOINTMENT (OUTPATIENT)
Dept: ENDOCRINOLOGY | Facility: CLINIC | Age: 36
End: 2021-05-05

## 2021-05-05 VITALS
BODY MASS INDEX: 24.8 KG/M2 | DIASTOLIC BLOOD PRESSURE: 72 MMHG | WEIGHT: 140 LBS | TEMPERATURE: 96.6 F | HEIGHT: 63 IN | OXYGEN SATURATION: 95 % | HEART RATE: 72 BPM | SYSTOLIC BLOOD PRESSURE: 105 MMHG

## 2021-05-05 PROCEDURE — 99213 OFFICE O/P EST LOW 20 MIN: CPT | Mod: GC

## 2021-05-05 RX ORDER — INSULIN HUMAN 4-8-12(60)
4 & 8 & 12 KIT INHALATION
Qty: 2 | Refills: 5 | Status: DISCONTINUED | COMMUNITY
Start: 2020-06-04 | End: 2021-05-05

## 2021-05-05 NOTE — ASSESSMENT
[FreeTextEntry1] : 35 year old patient with known type 1 DM who present for follow up post hospital admission for DKA after having dental infection currently on Basaglar 20 units at night  ADmelog 6-8 units TIDAC , used to be on Afrezza \par HBa1c 11% up from 8.8 % \par \par - increase Basaglar to 25 units at bedtime , increase by 2 units every 2-3 days if FS still high \par increase admelog to 10 units  TIDAC + ISS 2:50 >150 \par - f/u podiatry \par - f/u ophthalmology \par \par PATIENT CONTINUES ON DEXCOM CONTINUOUS GLUCOSE MONITOR FOR TYPE 1 DIABETES, PRIOR TO THIS SHE WAS CHECKING FINGERSTICK GLUCOSES 4 TIMES DAILY, AND UISING THOSE MEASUREMENTS TO ADJUST INSULIN DOSAGE. SHE IS ON 4 INSULIN INJECTIONS DAILY. \par  \par  [Diabetes Foot Care] : diabetes foot care [Long Term Vascular Complications] : long term vascular complications of diabetes [Carbohydrate Consistent Diet] : carbohydrate consistent diet [Importance of Diet and Exercise] : importance of diet and exercise to improve glycemic control, achieve weight loss and improve cardiovascular health [Exercise/Effect on Glucose] : exercise/effect on glucose [Self Monitoring of Blood Glucose] : self monitoring of blood glucose [Insulin Self-Administration] : insulin self-administration [Retinopathy Screening] : Patient was referred to ophthalmology for retinopathy screening

## 2021-05-05 NOTE — REVIEW OF SYSTEMS
[Fatigue] : no fatigue [Decreased Appetite] : appetite not decreased [Recent Weight Gain (___ Lbs)] : no recent weight gain [Recent Weight Loss (___ Lbs)] : no recent weight loss [Blurred Vision] : blurred vision [Dysphagia] : no dysphagia [Neck Pain] : no neck pain [Dysphonia] : no dysphonia [Chest Pain] : no chest pain [Palpitations] : no palpitations [Shortness Of Breath] : no shortness of breath [SOB on Exertion] : no shortness of breath on exertion [Nausea] : no nausea [Constipation] : no constipation [Vomiting] : no vomiting [Diarrhea] : no diarrhea [Polyuria] : polyuria [Dysuria] : no dysuria [Nocturia] : nocturia [Acanthosis] : no acanthosis  [Headaches] : no headaches [Tremors] : no tremors [Polydipsia] : no polydipsia [Cold Intolerance] : no cold intolerance [Heat Intolerance] : no heat intolerance

## 2021-05-05 NOTE — PHYSICAL EXAM
[Well Nourished] : well nourished [Healthy Appearance] : healthy appearance [No Acute Distress] : no acute distress [No Proptosis] : no proptosis [No Lid Lag] : no lid lag [Thyroid Not Enlarged] : the thyroid was not enlarged [No Accessory Muscle Use] : no accessory muscle use [Clear to Auscultation] : lungs were clear to auscultation bilaterally [No Murmurs] : no murmurs [Regular Rhythm] : with a regular rhythm [No Edema] : no peripheral edema [Normal Bowel Sounds] : normal bowel sounds [Not Tender] : non-tender [Soft] : abdomen soft [No Stigmata of Cushings Syndrome] : no stigmata of Cushings Syndrome [Abdominal Striae] : no abdominal striae [Acanthosis Nigricans] : no acanthosis nigricans [No Tremors] : no tremors [Oriented x3] : oriented to person, place, and time

## 2021-05-05 NOTE — HISTORY OF PRESENT ILLNESS
[FreeTextEntry1] : 35 year old patient with known type 1 DM who present for follow up post hospital admission for DKA after having dental infection currently on Basaglar 20 units at night  ADmelog 6-8 units TIDAC \par \par was admitted to hospital end of March /april 2021 for DKA after getting antibiotics fro dental infection, prior she was on Afrezza since discharge she was switched to admelog.  most recent HBA1c 11%  patient also uses dexcom   G6 but having problems with connection to phone so was checking FS and numbers are high in 300-400 range

## 2021-05-10 NOTE — HISTORY OF PRESENT ILLNESS
[FreeTextEntry1] : Patient with h/o DM is here for the follow up. She denies any complaints.  [de-identified] : 35F w/ pmh T1DM following with Endo here for f/u visit

## 2021-05-10 NOTE — HISTORY OF PRESENT ILLNESS
[FreeTextEntry1] : Patient with h/o DM is here for the follow up. She denies any complaints.  [de-identified] : 35F w/ pmh T1DM following with Endo here for f/u visit

## 2021-05-10 NOTE — ASSESSMENT
[Carbohydrate Consistent Diet] : carbohydrate consistent diet [Hypoglycemia Management] : hypoglycemia management [Diabetes Foot Care] : diabetes foot care [Long Term Vascular Complications] : long term vascular complications of diabetes [Importance of Diet and Exercise] : importance of diet and exercise to improve glycemic control, achieve weight loss and improve cardiovascular health [Action and use of Insulin] : action and use of short and long-acting insulin [FreeTextEntry1] : 35 F with PMH as listed above presents for \par \par # T1DM, uncontrolled \par - c/w \par - hba1c 4/30/21 11.4%\par - CMP showd elevated BG, AG, dec bicarb\par \par \par # HCM\par - papsmear utd 02/2021, negative with HPV negative, can rpt in 5 years unless she has new sexual partner\par - HCM labs utd \par \par rtc 3 mos \par

## 2021-05-14 ENCOUNTER — OUTPATIENT (OUTPATIENT)
Dept: OUTPATIENT SERVICES | Facility: HOSPITAL | Age: 36
LOS: 1 days | Discharge: HOME | End: 2021-05-14

## 2021-05-14 ENCOUNTER — APPOINTMENT (OUTPATIENT)
Dept: NUTRITION | Facility: CLINIC | Age: 36
End: 2021-05-14

## 2021-05-18 ENCOUNTER — NON-APPOINTMENT (OUTPATIENT)
Age: 36
End: 2021-05-18

## 2021-05-18 RX ORDER — ELECTROLYTES/DEXTROSE
31G X 8 MM SOLUTION, ORAL ORAL
Qty: 3 | Refills: 3 | Status: ACTIVE | COMMUNITY
Start: 2021-05-18 | End: 1900-01-01

## 2021-06-08 ENCOUNTER — OUTPATIENT (OUTPATIENT)
Dept: OUTPATIENT SERVICES | Facility: HOSPITAL | Age: 36
LOS: 1 days | Discharge: HOME | End: 2021-06-08

## 2021-06-08 ENCOUNTER — APPOINTMENT (OUTPATIENT)
Dept: NUTRITION | Facility: CLINIC | Age: 36
End: 2021-06-08

## 2021-06-09 ENCOUNTER — NON-APPOINTMENT (OUTPATIENT)
Age: 36
End: 2021-06-09

## 2021-06-09 DIAGNOSIS — E10.9 TYPE 1 DIABETES MELLITUS WITHOUT COMPLICATIONS: ICD-10-CM

## 2021-06-25 ENCOUNTER — OUTPATIENT (OUTPATIENT)
Dept: OUTPATIENT SERVICES | Facility: HOSPITAL | Age: 36
LOS: 1 days | Discharge: HOME | End: 2021-06-25

## 2021-06-25 ENCOUNTER — NON-APPOINTMENT (OUTPATIENT)
Age: 36
End: 2021-06-25

## 2021-06-25 ENCOUNTER — APPOINTMENT (OUTPATIENT)
Dept: NUTRITION | Facility: CLINIC | Age: 36
End: 2021-06-25

## 2021-06-26 LAB
ALBUMIN SERPL ELPH-MCNC: 4.3 G/DL
ALP BLD-CCNC: 89 U/L
ALT SERPL-CCNC: 12 U/L
ANION GAP SERPL CALC-SCNC: 16 MMOL/L
AST SERPL-CCNC: 24 U/L
BASOPHILS # BLD AUTO: 0.02 K/UL
BASOPHILS NFR BLD AUTO: 0.4 %
BILIRUB SERPL-MCNC: <0.2 MG/DL
BUN SERPL-MCNC: 17 MG/DL
CALCIUM SERPL-MCNC: 9.6 MG/DL
CHLORIDE SERPL-SCNC: 102 MMOL/L
CO2 SERPL-SCNC: 22 MMOL/L
CREAT SERPL-MCNC: 0.9 MG/DL
EOSINOPHIL # BLD AUTO: 0.24 K/UL
EOSINOPHIL NFR BLD AUTO: 4.3 %
GLUCOSE SERPL-MCNC: 112 MG/DL
HCT VFR BLD CALC: 40.7 %
HGB BLD-MCNC: 12.9 G/DL
IMM GRANULOCYTES NFR BLD AUTO: 0.2 %
LYMPHOCYTES # BLD AUTO: 1.74 K/UL
LYMPHOCYTES NFR BLD AUTO: 31.2 %
MAN DIFF?: NORMAL
MCHC RBC-ENTMCNC: 28 PG
MCHC RBC-ENTMCNC: 31.7 G/DL
MCV RBC AUTO: 88.3 FL
MONOCYTES # BLD AUTO: 0.65 K/UL
MONOCYTES NFR BLD AUTO: 11.7 %
NEUTROPHILS # BLD AUTO: 2.91 K/UL
NEUTROPHILS NFR BLD AUTO: 52.2 %
PLATELET # BLD AUTO: 445 K/UL
POTASSIUM SERPL-SCNC: 4.2 MMOL/L
PROT SERPL-MCNC: 7.4 G/DL
RBC # BLD: 4.61 M/UL
RBC # FLD: 14.1 %
SODIUM SERPL-SCNC: 140 MMOL/L
WBC # FLD AUTO: 5.57 K/UL

## 2021-06-28 ENCOUNTER — OUTPATIENT (OUTPATIENT)
Dept: OUTPATIENT SERVICES | Facility: HOSPITAL | Age: 36
LOS: 1 days | Discharge: HOME | End: 2021-06-28

## 2021-06-28 ENCOUNTER — APPOINTMENT (OUTPATIENT)
Dept: INTERNAL MEDICINE | Facility: CLINIC | Age: 36
End: 2021-06-28
Payer: MEDICAID

## 2021-06-28 VITALS
BODY MASS INDEX: 25.69 KG/M2 | HEART RATE: 87 BPM | HEIGHT: 63 IN | DIASTOLIC BLOOD PRESSURE: 78 MMHG | WEIGHT: 145 LBS | TEMPERATURE: 97.5 F | SYSTOLIC BLOOD PRESSURE: 116 MMHG | OXYGEN SATURATION: 97 %

## 2021-06-28 DIAGNOSIS — Z00.00 ENCOUNTER FOR GENERAL ADULT MEDICAL EXAMINATION W/OUT ABNORMAL FINDINGS: ICD-10-CM

## 2021-06-28 PROCEDURE — 99213 OFFICE O/P EST LOW 20 MIN: CPT | Mod: GC

## 2021-06-28 RX ORDER — INSULIN LISPRO 100 U/ML
100 INJECTION, SOLUTION SUBCUTANEOUS
Qty: 1 | Refills: 5 | Status: DISCONTINUED | COMMUNITY
Start: 2020-01-29 | End: 2021-06-28

## 2021-06-28 RX ORDER — INSULIN DEGLUDEC INJECTION 200 U/ML
200 INJECTION, SOLUTION SUBCUTANEOUS DAILY
Qty: 9 | Refills: 0 | Status: COMPLETED | COMMUNITY
Start: 2020-06-04 | End: 2021-06-28

## 2021-06-29 DIAGNOSIS — E10.65 TYPE 1 DIABETES MELLITUS WITH HYPERGLYCEMIA: ICD-10-CM

## 2021-06-30 ENCOUNTER — NON-APPOINTMENT (OUTPATIENT)
Age: 36
End: 2021-06-30

## 2021-07-02 ENCOUNTER — NON-APPOINTMENT (OUTPATIENT)
Age: 36
End: 2021-07-02

## 2021-07-08 ENCOUNTER — APPOINTMENT (OUTPATIENT)
Dept: NUTRITION | Facility: CLINIC | Age: 36
End: 2021-07-08

## 2021-07-10 PROBLEM — Z00.00 ENCOUNTER FOR PREVENTIVE HEALTH EXAMINATION: Status: ACTIVE | Noted: 2019-12-10

## 2021-07-10 NOTE — PLAN
[FreeTextEntry1] : Continue Insulin as per endocrinology recommendations. \par Monitor Hgb A1C.\par Patient will be following up with endocrinology\par Compliance adherence was discussed\par

## 2021-07-10 NOTE — ASSESSMENT
[FreeTextEntry1] :   Patient checks fingerstick blood glucoses 4 times daily, takes 4 insulin injections daily, and uses the glucose measurements to adjust insulin dosage. [Carbohydrate Consistent Diet] : carbohydrate consistent diet [Hypoglycemia Management] : hypoglycemia management [Diabetes Foot Care] : diabetes foot care [Long Term Vascular Complications] : long term vascular complications of diabetes [Importance of Diet and Exercise] : importance of diet and exercise to improve glycemic control, achieve weight loss and improve cardiovascular health [Action and use of Insulin] : action and use of short and long-acting insulin

## 2021-07-10 NOTE — HISTORY OF PRESENT ILLNESS
[de-identified] : 35 year old female presented for the follow up. [FreeTextEntry1] : Patient with h/o DM is here for the follow up. She denies any complaints.

## 2021-07-13 ENCOUNTER — OUTPATIENT (OUTPATIENT)
Dept: OUTPATIENT SERVICES | Facility: HOSPITAL | Age: 36
LOS: 1 days | Discharge: HOME | End: 2021-07-13
Payer: MEDICAID

## 2021-07-13 ENCOUNTER — APPOINTMENT (OUTPATIENT)
Dept: OPHTHALMOLOGY | Facility: CLINIC | Age: 36
End: 2021-07-13

## 2021-07-13 PROCEDURE — 92012 INTRM OPH EXAM EST PATIENT: CPT

## 2021-07-13 PROCEDURE — 92134 CPTRZ OPH DX IMG PST SGM RTA: CPT | Mod: 26

## 2021-07-19 DIAGNOSIS — H52.203 UNSPECIFIED ASTIGMATISM, BILATERAL: ICD-10-CM

## 2021-07-19 DIAGNOSIS — E11.9 TYPE 2 DIABETES MELLITUS WITHOUT COMPLICATIONS: ICD-10-CM

## 2021-07-19 DIAGNOSIS — H35.89 OTHER SPECIFIED RETINAL DISORDERS: ICD-10-CM

## 2021-08-02 ENCOUNTER — NON-APPOINTMENT (OUTPATIENT)
Age: 36
End: 2021-08-02

## 2021-08-03 ENCOUNTER — APPOINTMENT (OUTPATIENT)
Dept: NUTRITION | Facility: CLINIC | Age: 36
End: 2021-08-03

## 2021-08-04 ENCOUNTER — APPOINTMENT (OUTPATIENT)
Dept: NUTRITION | Facility: CLINIC | Age: 36
End: 2021-08-04

## 2021-08-08 RX ORDER — INSULIN GLARGINE 100 [IU]/ML
100 INJECTION, SOLUTION SUBCUTANEOUS
Qty: 1 | Refills: 5 | Status: DISCONTINUED | COMMUNITY
Start: 2020-01-29 | End: 2021-08-08

## 2021-08-11 ENCOUNTER — APPOINTMENT (OUTPATIENT)
Dept: ENDOCRINOLOGY | Facility: CLINIC | Age: 36
End: 2021-08-11

## 2021-08-16 ENCOUNTER — NON-APPOINTMENT (OUTPATIENT)
Age: 36
End: 2021-08-16

## 2021-08-25 ENCOUNTER — NON-APPOINTMENT (OUTPATIENT)
Age: 36
End: 2021-08-25

## 2021-08-25 ENCOUNTER — APPOINTMENT (OUTPATIENT)
Dept: OPHTHALMOLOGY | Facility: CLINIC | Age: 36
End: 2021-08-25

## 2021-09-15 ENCOUNTER — APPOINTMENT (OUTPATIENT)
Dept: INTERNAL MEDICINE | Facility: CLINIC | Age: 36
End: 2021-09-15

## 2021-09-21 RX ORDER — INSULIN GLARGINE 100 [IU]/ML
100 INJECTION, SOLUTION SUBCUTANEOUS DAILY
Qty: 1 | Refills: 5 | Status: DISCONTINUED | COMMUNITY
Start: 2021-08-08 | End: 2021-09-21

## 2021-09-22 RX ORDER — NITROFURANTOIN (MONOHYDRATE/MACROCRYSTALS) 25; 75 MG/1; MG/1
100 CAPSULE ORAL TWICE DAILY
Qty: 14 | Refills: 0 | Status: ACTIVE | COMMUNITY
Start: 2021-09-22 | End: 1900-01-01

## 2021-09-28 ENCOUNTER — NON-APPOINTMENT (OUTPATIENT)
Age: 36
End: 2021-09-28

## 2021-09-30 RX ORDER — LANCETS
EACH MISCELLANEOUS
Qty: 100 | Refills: 3 | Status: ACTIVE | COMMUNITY
Start: 2021-09-30 | End: 1900-01-01

## 2021-09-30 RX ORDER — BLOOD-GLUCOSE METER
W/DEVICE KIT MISCELLANEOUS
Qty: 1 | Refills: 0 | Status: ACTIVE | COMMUNITY
Start: 2021-09-30 | End: 1900-01-01

## 2021-10-19 ENCOUNTER — APPOINTMENT (OUTPATIENT)
Dept: OPHTHALMOLOGY | Facility: CLINIC | Age: 36
End: 2021-10-19

## 2021-10-19 ENCOUNTER — APPOINTMENT (OUTPATIENT)
Dept: OBGYN | Facility: CLINIC | Age: 36
End: 2021-10-19
Payer: MEDICAID

## 2021-10-19 ENCOUNTER — OUTPATIENT (OUTPATIENT)
Dept: OUTPATIENT SERVICES | Facility: HOSPITAL | Age: 36
LOS: 1 days | Discharge: HOME | End: 2021-10-19

## 2021-10-19 VITALS — SYSTOLIC BLOOD PRESSURE: 100 MMHG | DIASTOLIC BLOOD PRESSURE: 60 MMHG | WEIGHT: 155 LBS | BODY MASS INDEX: 27.46 KG/M2

## 2021-10-19 DIAGNOSIS — Z87.898 PERSONAL HISTORY OF OTHER SPECIFIED CONDITIONS: ICD-10-CM

## 2021-10-19 PROCEDURE — 99212 OFFICE O/P EST SF 10 MIN: CPT

## 2021-10-19 NOTE — PLAN
[FreeTextEntry1] : pt here for F/U UTI- states she feels better but wants to know how to avoid and make sure its completely gone. \par teaching r/t UTI prevention done including- uva ursa, d- MAnnose and apple cider vinegar also increased fluid, voiding after sex and cleaning front to back,\par RX for culture given\par will f/u with results.\par rv in feb or PRN

## 2021-10-19 NOTE — HISTORY OF PRESENT ILLNESS
[FreeTextEntry1] : 37 y/o here for f/u UTI in sept for which she called me and I gave her treatment. she feels better but wants to make sure its gone and want to know how to prevent in the future.

## 2021-10-21 LAB — BACTERIA UR CULT: NORMAL

## 2022-01-18 RX ORDER — INSULIN PUMP CONTROLLER
EACH MISCELLANEOUS
Qty: 1 | Refills: 3 | Status: DISCONTINUED | COMMUNITY
Start: 2021-07-10 | End: 2022-01-18

## 2022-01-18 RX ORDER — BLOOD-GLUCOSE METER
W/DEVICE KIT MISCELLANEOUS
Qty: 1 | Refills: 0 | Status: DISCONTINUED | COMMUNITY
Start: 2021-02-03 | End: 2022-01-18

## 2022-01-18 RX ORDER — BLOOD SUGAR DIAGNOSTIC
STRIP MISCELLANEOUS 3 TIMES DAILY
Qty: 90 | Refills: 0 | Status: DISCONTINUED | COMMUNITY
Start: 2020-01-29 | End: 2022-01-18

## 2022-02-08 LAB
ALBUMIN SERPL ELPH-MCNC: 4.1 G/DL
ALP BLD-CCNC: 63 U/L
ALT SERPL-CCNC: 7 U/L
ANION GAP SERPL CALC-SCNC: 15 MMOL/L
AST SERPL-CCNC: 17 U/L
BILIRUB SERPL-MCNC: 0.3 MG/DL
BUN SERPL-MCNC: 17 MG/DL
CALCIUM SERPL-MCNC: 8.8 MG/DL
CHLORIDE SERPL-SCNC: 103 MMOL/L
CO2 SERPL-SCNC: 21 MMOL/L
CREAT SERPL-MCNC: 0.9 MG/DL
ESTIMATED AVERAGE GLUCOSE: 171 MG/DL
GLUCOSE SERPL-MCNC: 118 MG/DL
HBA1C MFR BLD HPLC: 7.6 %
POTASSIUM SERPL-SCNC: 4.3 MMOL/L
PROT SERPL-MCNC: 7.2 G/DL
SODIUM SERPL-SCNC: 139 MMOL/L

## 2022-02-08 RX ORDER — NITROFURANTOIN (MONOHYDRATE/MACROCRYSTALS) 25; 75 MG/1; MG/1
100 CAPSULE ORAL TWICE DAILY
Qty: 14 | Refills: 0 | Status: ACTIVE | COMMUNITY
Start: 2022-02-08 | End: 1900-01-01

## 2022-02-09 LAB
CREAT SPEC-SCNC: 338 MG/DL
MICROALBUMIN 24H UR DL<=1MG/L-MCNC: <1.2 MG/DL
MICROALBUMIN/CREAT 24H UR-RTO: NORMAL MG/G

## 2022-02-15 ENCOUNTER — OUTPATIENT (OUTPATIENT)
Dept: OUTPATIENT SERVICES | Facility: HOSPITAL | Age: 37
LOS: 1 days | Discharge: HOME | End: 2022-02-15

## 2022-02-15 ENCOUNTER — APPOINTMENT (OUTPATIENT)
Dept: OBGYN | Facility: CLINIC | Age: 37
End: 2022-02-15
Payer: MEDICAID

## 2022-02-15 VITALS
HEIGHT: 63 IN | DIASTOLIC BLOOD PRESSURE: 70 MMHG | SYSTOLIC BLOOD PRESSURE: 130 MMHG | WEIGHT: 160 LBS | BODY MASS INDEX: 28.35 KG/M2

## 2022-02-15 DIAGNOSIS — Z78.9 OTHER SPECIFIED HEALTH STATUS: ICD-10-CM

## 2022-02-15 DIAGNOSIS — Z01.419 ENCOUNTER FOR GYNECOLOGICAL EXAMINATION (GENERAL) (ROUTINE) W/OUT ABNORMAL FINDINGS: ICD-10-CM

## 2022-02-15 PROCEDURE — 99395 PREV VISIT EST AGE 18-39: CPT

## 2022-02-15 NOTE — HISTORY OF PRESENT ILLNESS
[Regular Cycle Intervals] : periods have been regular [Menarche Age: ____] : age at menarche was [unfilled] [No] : Patient does not have concerns regarding sex [Currently Active] : currently active [Men] : men [Vaginal] : vaginal [Patient would like to be screened for STIs] : Patient would like to be screened for STIs [FreeTextEntry1] : 1/15/22

## 2022-02-15 NOTE — PLAN
[FreeTextEntry1] : pt here for annual exam with request for STD screening\par vaginitis panel done\par will f/u with results.

## 2022-02-15 NOTE — PHYSICAL EXAM
[Appropriately responsive] : appropriately responsive [Alert] : alert [No Acute Distress] : no acute distress [No Lymphadenopathy] : no lymphadenopathy [Soft] : soft [Non-tender] : non-tender [Non-distended] : non-distended [No HSM] : No HSM [No Lesions] : no lesions [No Mass] : no mass [Oriented x3] : oriented x3 [Examination Of The Breasts] : a normal appearance [No Masses] : no breast masses were palpable [Labia Majora] : normal [Labia Minora] : normal [Normal] : normal [Uterine Adnexae] : normal [Declined] : Patient declined rectal exam

## 2022-05-10 ENCOUNTER — NON-APPOINTMENT (OUTPATIENT)
Age: 37
End: 2022-05-10

## 2022-05-10 RX ORDER — NITROFURANTOIN (MONOHYDRATE/MACROCRYSTALS) 25; 75 MG/1; MG/1
100 CAPSULE ORAL
Qty: 10 | Refills: 0 | Status: ACTIVE | COMMUNITY
Start: 2022-05-10 | End: 1900-01-01

## 2022-05-13 LAB — BACTERIA UR CULT: NORMAL

## 2022-06-14 ENCOUNTER — NON-APPOINTMENT (OUTPATIENT)
Age: 37
End: 2022-06-14

## 2022-06-14 RX ORDER — NITROFURANTOIN (MONOHYDRATE/MACROCRYSTALS) 25; 75 MG/1; MG/1
100 CAPSULE ORAL TWICE DAILY
Qty: 14 | Refills: 0 | Status: ACTIVE | COMMUNITY
Start: 2022-06-14 | End: 1900-01-01

## 2022-07-12 ENCOUNTER — OUTPATIENT (OUTPATIENT)
Dept: OUTPATIENT SERVICES | Facility: HOSPITAL | Age: 37
LOS: 1 days | Discharge: HOME | End: 2022-07-12

## 2022-07-12 ENCOUNTER — LABORATORY RESULT (OUTPATIENT)
Age: 37
End: 2022-07-12

## 2022-07-12 ENCOUNTER — APPOINTMENT (OUTPATIENT)
Dept: OBGYN | Facility: CLINIC | Age: 37
End: 2022-07-12

## 2022-07-12 VITALS
SYSTOLIC BLOOD PRESSURE: 117 MMHG | WEIGHT: 159 LBS | HEIGHT: 63 IN | BODY MASS INDEX: 28.17 KG/M2 | DIASTOLIC BLOOD PRESSURE: 69 MMHG

## 2022-07-12 DIAGNOSIS — N89.8 OTHER SPECIFIED NONINFLAMMATORY DISORDERS OF VAGINA: ICD-10-CM

## 2022-07-12 PROCEDURE — 99213 OFFICE O/P EST LOW 20 MIN: CPT

## 2022-07-12 NOTE — PLAN
[FreeTextEntry1] : d/w pt different supplement for bladder health\par also advised that if all cultures are negative and she still feels this discomfort that she needs to see a urologist.\par vaginitis panel and U/A C/S sent\par will f/u with results

## 2022-07-12 NOTE — HISTORY OF PRESENT ILLNESS
[FreeTextEntry1] : 36 y/o  LMP 7/6/22 Here with c/o of frequent UTI.  last took Macrobid in June also in may and February, cultures have been negative. she described burning and pressure when shes urinates also notes an odor. this particular episode has been going on since Sunday,

## 2022-07-14 LAB
APPEARANCE: CLEAR
BACTERIA UR CULT: NORMAL
BILIRUBIN URINE: NEGATIVE
BLOOD URINE: NEGATIVE
COLOR: YELLOW
GLUCOSE QUALITATIVE U: NEGATIVE
KETONES URINE: NEGATIVE
LEUKOCYTE ESTERASE URINE: NEGATIVE
NITRITE URINE: NEGATIVE
PH URINE: 6.5
PROTEIN URINE: ABNORMAL
SPECIFIC GRAVITY URINE: 1.03
UROBILINOGEN URINE: NORMAL

## 2022-08-24 ENCOUNTER — APPOINTMENT (OUTPATIENT)
Dept: ENDOCRINOLOGY | Facility: CLINIC | Age: 37
End: 2022-08-24

## 2022-08-24 ENCOUNTER — OUTPATIENT (OUTPATIENT)
Dept: OUTPATIENT SERVICES | Facility: HOSPITAL | Age: 37
LOS: 1 days | Discharge: HOME | End: 2022-08-24

## 2022-08-24 NOTE — HISTORY OF PRESENT ILLNESS
[FreeTextEntry1] : follow up [de-identified] : Patient is a 38 yo female w/ type 1 DM, here for follow up. Currently on AdmelJulienne Tran. Currently checks glucose w/ sensor, continuous monitoring. A1c 7.6% in February 2022, patient reports this is an improvement. Denies numbness/tingling, loss of sensation, dysuria, polyuria. Last DKA episode 1 year ago.

## 2022-08-24 NOTE — REVIEW OF SYSTEMS
[Fever] : no fever [Chills] : no chills [Fatigue] : no fatigue [Earache] : no earache [Hearing Loss] : no hearing loss [Chest Pain] : no chest pain [Palpitations] : no palpitations [Shortness Of Breath] : no shortness of breath [Wheezing] : no wheezing [Cough] : no cough [Abdominal Pain] : no abdominal pain [Nausea] : no nausea [Constipation] : no constipation [Dysuria] : no dysuria [Incontinence] : no incontinence [Frequency] : no frequency [Joint Pain] : no joint pain [Joint Stiffness] : no joint stiffness [Headache] : no headache [Dizziness] : no dizziness

## 2022-08-24 NOTE — ASSESSMENT
[FreeTextEntry1] : Patient is a 36 yo female w/ type 1 DM, here for follow up. Currently on Admelog Julienne Aldrich. Currently checks glucose w/ sensor, continuous monitoring\par \par - HbA1c feb 2022: 7.6%\par - c/w Admelog and Afrezza\par - f/u podiatry \par - f/u ophthalmology \par - check HbA1c\par \par TYPE 1 DIABETES. SHE USES BLOOD GLUCOSE MEASUREMENTS TO ADJUST INSULIN DOSAGE ON A SLIDING SCALE WITH CARBOHYDRATE COUNTING, ON DEXCOM CONTINUOUS GLUCOSE SENSOR.

## 2022-08-24 NOTE — PHYSICAL EXAM
[No Acute Distress] : no acute distress [Well Nourished] : well nourished [Normal Sclera/Conjunctiva] : normal sclera/conjunctiva [Normal Outer Ear/Nose] : the outer ears and nose were normal in appearance [Normal Oropharynx] : the oropharynx was normal [No JVD] : no jugular venous distention [No Respiratory Distress] : no respiratory distress  [No Accessory Muscle Use] : no accessory muscle use [Normal Rate] : normal rate  [Regular Rhythm] : with a regular rhythm [No Abdominal Bruit] : a ~M bruit was not heard ~T in the abdomen [No Edema] : there was no peripheral edema [Soft] : abdomen soft [Non Tender] : non-tender [No Joint Swelling] : no joint swelling [No Rash] : no rash [Coordination Grossly Intact] : coordination grossly intact [No Focal Deficits] : no focal deficits [Normal Gait] : normal gait

## 2022-09-20 ENCOUNTER — APPOINTMENT (OUTPATIENT)
Dept: UROLOGY | Facility: CLINIC | Age: 37
End: 2022-09-20

## 2022-09-20 VITALS
HEART RATE: 84 BPM | WEIGHT: 159 LBS | OXYGEN SATURATION: 98 % | DIASTOLIC BLOOD PRESSURE: 77 MMHG | BODY MASS INDEX: 28.17 KG/M2 | HEIGHT: 63 IN | RESPIRATION RATE: 16 BRPM | SYSTOLIC BLOOD PRESSURE: 128 MMHG | TEMPERATURE: 97.7 F

## 2022-09-20 DIAGNOSIS — Z78.9 OTHER SPECIFIED HEALTH STATUS: ICD-10-CM

## 2022-09-20 LAB
BILIRUB UR QL STRIP: NORMAL
COLLECTION METHOD: NORMAL
GLUCOSE UR-MCNC: NORMAL
HCG UR QL: 0.2 EU/DL
HGB UR QL STRIP.AUTO: NORMAL
KETONES UR-MCNC: NORMAL
LEUKOCYTE ESTERASE UR QL STRIP: NORMAL
NITRITE UR QL STRIP: NORMAL
PH UR STRIP: 6
PROT UR STRIP-MCNC: NORMAL
SP GR UR STRIP: 1.02

## 2022-09-20 PROCEDURE — 99203 OFFICE O/P NEW LOW 30 MIN: CPT

## 2022-09-20 NOTE — ASSESSMENT
[FreeTextEntry1] : 36 y/o female with dysuria post intercourse, but negative on urine cultures. We discussed that this could be from irritation post coital, but we could set her up for a cystscopy to rule out any bladder or urinary issues. The pt would much rather have this procedure done by another female, therefore, we have referred her to Urogynecology for a further work up. We discussed blood sugar control as this can contribute to urinary symptoms and be a risk factor. All her questions have otherwise been answered. A total time of 30 mins were spent with the pt. reviewing the chart, and answering questions in this consultative visit.

## 2022-09-20 NOTE — HISTORY OF PRESENT ILLNESS
[FreeTextEntry1] : 36 y/o female who comes in with a complaint of dysuria post intercourse. Pt thought that she was getting frequent UTIs, but her cultures have been negative. Her gynecologist had sent her to urology for further workup. Pt denies any urinary urgency, frequency, nocturia, or hematuria. She does have a hx of uncontrolled DM. No prior hx of stones.\par \par pmhx: DM\par \par pshx: \par \par social hx: no tobacco use, drinks alcohol occasionally \par \par allergies: NKDA

## 2022-10-06 ENCOUNTER — APPOINTMENT (OUTPATIENT)
Dept: OPHTHALMOLOGY | Facility: CLINIC | Age: 37
End: 2022-10-06

## 2022-11-07 ENCOUNTER — RX RENEWAL (OUTPATIENT)
Age: 37
End: 2022-11-07

## 2022-11-10 ENCOUNTER — APPOINTMENT (OUTPATIENT)
Dept: OPHTHALMOLOGY | Facility: CLINIC | Age: 37
End: 2022-11-10

## 2022-11-10 ENCOUNTER — OUTPATIENT (OUTPATIENT)
Dept: OUTPATIENT SERVICES | Facility: HOSPITAL | Age: 37
LOS: 1 days | Discharge: HOME | End: 2022-11-10

## 2022-11-10 PROCEDURE — 92012 INTRM OPH EXAM EST PATIENT: CPT

## 2023-02-09 ENCOUNTER — OUTPATIENT (OUTPATIENT)
Dept: OUTPATIENT SERVICES | Facility: HOSPITAL | Age: 38
LOS: 1 days | End: 2023-02-09
Payer: MEDICAID

## 2023-02-09 ENCOUNTER — APPOINTMENT (OUTPATIENT)
Dept: ENDOCRINOLOGY | Facility: CLINIC | Age: 38
End: 2023-02-09

## 2023-02-09 VITALS
SYSTOLIC BLOOD PRESSURE: 123 MMHG | WEIGHT: 159 LBS | DIASTOLIC BLOOD PRESSURE: 77 MMHG | BODY MASS INDEX: 28.17 KG/M2 | HEART RATE: 68 BPM

## 2023-02-09 DIAGNOSIS — Z00.00 ENCOUNTER FOR GENERAL ADULT MEDICAL EXAMINATION WITHOUT ABNORMAL FINDINGS: ICD-10-CM

## 2023-02-09 PROCEDURE — 99214 OFFICE O/P EST MOD 30 MIN: CPT

## 2023-02-09 NOTE — PHYSICAL EXAM
[No Acute Distress] : no acute distress [No Respiratory Distress] : no respiratory distress  [Normal Rate] : normal rate  [Soft] : abdomen soft [Normal] : soft, non-tender, non-distended, no masses palpated, no HSM and normal bowel sounds

## 2023-02-13 DIAGNOSIS — E10.65 TYPE 1 DIABETES MELLITUS WITH HYPERGLYCEMIA: ICD-10-CM

## 2023-02-15 RX ORDER — BLOOD SUGAR DIAGNOSTIC
STRIP MISCELLANEOUS
Qty: 100 | Refills: 5 | Status: ACTIVE | COMMUNITY
Start: 2021-09-30 | End: 1900-01-01

## 2023-02-26 NOTE — ED ADULT NURSE NOTE - ISOLATION TYPE:
The patient is Stable - Low risk of patient condition declining or worsening    Shift Goals  Clinical Goals: Rest  Patient Goals: Rest  Family Goals: MARTINE    Progress made toward(s) clinical / shift goals:  Patient resting.    Patient is not progressing towards the following goals:       None

## 2023-02-27 DIAGNOSIS — E55.9 VITAMIN D DEFICIENCY, UNSPECIFIED: ICD-10-CM

## 2023-03-13 NOTE — HISTORY OF PRESENT ILLNESS
[FreeTextEntry1] : f/u DM  [de-identified] : 37 year old patient \par type 1 DM \par sugars run in the mid 100s mostly , occasional 80s once or twice per week. \par patient using CGM  to track sugars \par was admitted to hospital end of March /april 2021 for DKA after getting antibiotics fro dental infection\par A1c improving 11-> 7

## 2023-03-13 NOTE — ASSESSMENT
[FreeTextEntry1] : 37 year old F\par known type 1 DM\par presents for follow up\par \par # DM1 \par - HBa1c 8.8 -> 11% -> 7.6 \par - weight stable : BMI 28 \par - prot/cr ratio 30 \par - Affrezza 12u TID + coverage post meal  , Tresiba  30u before bed -> asked to do 26 to avoid lows \par - f/u podiatry \par - ophthalmology - last seen about 1y ago , encouraged to follow up \par \par PATIENT CONTINUES ON DEXCOM CONTINUOUS GLUCOSE MONITOR FOR TYPE 1 DIABETES, PRIOR TO THIS SHE WAS CHECKING FINGERSTICK GLUCOSES 4 TIMES DAILY, AND UISING THOSE MEASUREMENTS TO ADJUST INSULIN DOSAGE. SHE IS ON 4 INSULIN INJECTIONS DAILY.

## 2023-03-25 LAB
ALBUMIN SERPL ELPH-MCNC: 4.6 G/DL
ALP BLD-CCNC: 68 U/L
ALT SERPL-CCNC: 10 U/L
ANION GAP SERPL CALC-SCNC: 11 MMOL/L
AST SERPL-CCNC: 18 U/L
BASOPHILS # BLD AUTO: 0.03 K/UL
BASOPHILS NFR BLD AUTO: 0.6 %
BILIRUB SERPL-MCNC: <0.2 MG/DL
BUN SERPL-MCNC: 10 MG/DL
CALCIUM SERPL-MCNC: 9.4 MG/DL
CHLORIDE SERPL-SCNC: 103 MMOL/L
CHOLEST SERPL-MCNC: 202 MG/DL
CO2 SERPL-SCNC: 26 MMOL/L
CREAT SERPL-MCNC: 0.8 MG/DL
CREAT SPEC-SCNC: 230 MG/DL
EGFR: 97 ML/MIN/1.73M2
EOSINOPHIL # BLD AUTO: 0.19 K/UL
EOSINOPHIL NFR BLD AUTO: 3.6 %
ESTIMATED AVERAGE GLUCOSE: 180 MG/DL
GLUCOSE SERPL-MCNC: 58 MG/DL
HBA1C MFR BLD HPLC: 7.9 %
HCT VFR BLD CALC: 39.2 %
HDLC SERPL-MCNC: 71 MG/DL
HGB BLD-MCNC: 12.1 G/DL
IMM GRANULOCYTES NFR BLD AUTO: 0.4 %
LDLC SERPL CALC-MCNC: 123 MG/DL
LYMPHOCYTES # BLD AUTO: 1.83 K/UL
LYMPHOCYTES NFR BLD AUTO: 34.4 %
MAN DIFF?: NORMAL
MCHC RBC-ENTMCNC: 26.7 PG
MCHC RBC-ENTMCNC: 30.9 G/DL
MCV RBC AUTO: 86.3 FL
MICROALBUMIN 24H UR DL<=1MG/L-MCNC: <1.2 MG/DL
MICROALBUMIN/CREAT 24H UR-RTO: NORMAL MG/G
MONOCYTES # BLD AUTO: 0.41 K/UL
MONOCYTES NFR BLD AUTO: 7.7 %
NEUTROPHILS # BLD AUTO: 2.84 K/UL
NEUTROPHILS NFR BLD AUTO: 53.3 %
NONHDLC SERPL-MCNC: 131 MG/DL
PLATELET # BLD AUTO: 434 K/UL
POTASSIUM SERPL-SCNC: 4.5 MMOL/L
PROT SERPL-MCNC: 7.4 G/DL
RBC # BLD: 4.54 M/UL
RBC # FLD: 13 %
SODIUM SERPL-SCNC: 140 MMOL/L
TRIGL SERPL-MCNC: 41 MG/DL
TSH SERPL-ACNC: 1.56 UIU/ML
WBC # FLD AUTO: 5.32 K/UL

## 2023-03-26 LAB — 25(OH)D3 SERPL-MCNC: 17 NG/ML

## 2023-03-26 RX ORDER — ERGOCALCIFEROL 1.25 MG/1
1.25 MG CAPSULE ORAL
Qty: 13 | Refills: 3 | Status: ACTIVE | COMMUNITY
Start: 2023-03-26 | End: 1900-01-01

## 2023-03-28 ENCOUNTER — NON-APPOINTMENT (OUTPATIENT)
Age: 38
End: 2023-03-28

## 2023-05-04 RX ORDER — BLOOD-GLUCOSE,RECEIVER,CONT
EACH MISCELLANEOUS
Qty: 1 | Refills: 0 | Status: ACTIVE | COMMUNITY
Start: 2023-05-04 | End: 1900-01-01

## 2023-05-18 RX ORDER — BLOOD-GLUCOSE SENSOR
EACH MISCELLANEOUS
Qty: 3 | Refills: 3 | Status: DISCONTINUED | COMMUNITY
Start: 2020-02-05 | End: 2023-05-18

## 2023-05-18 RX ORDER — BLOOD-GLUCOSE TRANSMITTER
EACH MISCELLANEOUS
Qty: 1 | Refills: 3 | Status: DISCONTINUED | COMMUNITY
Start: 2020-02-05 | End: 2023-05-18

## 2023-05-18 RX ORDER — BLOOD-GLUCOSE,RECEIVER,CONT
EACH MISCELLANEOUS
Qty: 1 | Refills: 2 | Status: DISCONTINUED | COMMUNITY
Start: 2020-02-05 | End: 2023-05-18

## 2023-05-18 RX ORDER — BLOOD-GLUCOSE TRANSMITTER
EACH MISCELLANEOUS
Qty: 1 | Refills: 3 | Status: DISCONTINUED | COMMUNITY
Start: 2023-03-13 | End: 2023-05-18

## 2023-05-18 RX ORDER — BLOOD-GLUCOSE SENSOR
EACH MISCELLANEOUS
Qty: 1 | Refills: 5 | Status: DISCONTINUED | COMMUNITY
Start: 2023-03-13 | End: 2023-05-18

## 2023-06-06 RX ORDER — INSULIN LISPRO 100 U/ML
100 INJECTION, SOLUTION SUBCUTANEOUS 3 TIMES DAILY
Qty: 1 | Refills: 3 | Status: DISCONTINUED | COMMUNITY
Start: 2021-05-18 | End: 2023-06-06

## 2023-08-06 NOTE — PHYSICAL EXAM
[General Appearance - Well Nourished] : well nourished [Normal Appearance] : normal appearance [General Appearance - In No Acute Distress] : no acute distress [Oriented To Time, Place, And Person] : oriented to person, place, and time [Affect] : the affect was normal Yes - the patient is able to be screened

## 2023-08-10 ENCOUNTER — APPOINTMENT (OUTPATIENT)
Dept: ENDOCRINOLOGY | Facility: CLINIC | Age: 38
End: 2023-08-10

## 2023-09-28 ENCOUNTER — APPOINTMENT (OUTPATIENT)
Dept: ENDOCRINOLOGY | Facility: CLINIC | Age: 38
End: 2023-09-28

## 2023-09-28 ENCOUNTER — OUTPATIENT (OUTPATIENT)
Dept: OUTPATIENT SERVICES | Facility: HOSPITAL | Age: 38
LOS: 1 days | End: 2023-09-28
Payer: MEDICAID

## 2023-09-28 ENCOUNTER — OUTPATIENT (OUTPATIENT)
Dept: OUTPATIENT SERVICES | Facility: HOSPITAL | Age: 38
LOS: 1 days | End: 2023-09-28

## 2023-09-28 VITALS
DIASTOLIC BLOOD PRESSURE: 71 MMHG | HEART RATE: 66 BPM | WEIGHT: 150 LBS | BODY MASS INDEX: 26.58 KG/M2 | SYSTOLIC BLOOD PRESSURE: 112 MMHG | HEIGHT: 63 IN

## 2023-09-28 DIAGNOSIS — E10.65 TYPE 1 DIABETES MELLITUS WITH HYPERGLYCEMIA: ICD-10-CM

## 2023-09-28 DIAGNOSIS — Z00.00 ENCOUNTER FOR GENERAL ADULT MEDICAL EXAMINATION WITHOUT ABNORMAL FINDINGS: ICD-10-CM

## 2023-09-28 DIAGNOSIS — E55.9 VITAMIN D DEFICIENCY, UNSPECIFIED: ICD-10-CM

## 2023-09-28 PROCEDURE — 99214 OFFICE O/P EST MOD 30 MIN: CPT

## 2023-09-29 DIAGNOSIS — E10.65 TYPE 1 DIABETES MELLITUS WITH HYPERGLYCEMIA: ICD-10-CM

## 2023-09-29 DIAGNOSIS — E55.9 VITAMIN D DEFICIENCY, UNSPECIFIED: ICD-10-CM

## 2023-09-29 LAB
25(OH)D3 SERPL-MCNC: 34 NG/ML
ESTIMATED AVERAGE GLUCOSE: 203 MG/DL
HBA1C MFR BLD HPLC: 8.7 %

## 2023-10-02 DIAGNOSIS — E10.65 TYPE 1 DIABETES MELLITUS WITH HYPERGLYCEMIA: ICD-10-CM

## 2023-12-05 DIAGNOSIS — R30.0 DYSURIA: ICD-10-CM

## 2023-12-05 RX ORDER — NITROFURANTOIN (MONOHYDRATE/MACROCRYSTALS) 25; 75 MG/1; MG/1
100 CAPSULE ORAL
Qty: 14 | Refills: 0 | Status: ACTIVE | COMMUNITY
Start: 2023-12-05 | End: 1900-01-01

## 2024-01-25 ENCOUNTER — NON-APPOINTMENT (OUTPATIENT)
Age: 39
End: 2024-01-25

## 2024-01-29 ENCOUNTER — RX RENEWAL (OUTPATIENT)
Age: 39
End: 2024-01-29

## 2024-01-29 RX ORDER — BLOOD-GLUCOSE SENSOR
EACH MISCELLANEOUS
Qty: 3 | Refills: 11 | Status: ACTIVE | COMMUNITY
Start: 2023-05-04 | End: 1900-01-01

## 2024-06-29 ENCOUNTER — RX RENEWAL (OUTPATIENT)
Age: 39
End: 2024-06-29